# Patient Record
Sex: MALE | Race: BLACK OR AFRICAN AMERICAN | NOT HISPANIC OR LATINO | Employment: OTHER | URBAN - METROPOLITAN AREA
[De-identification: names, ages, dates, MRNs, and addresses within clinical notes are randomized per-mention and may not be internally consistent; named-entity substitution may affect disease eponyms.]

---

## 2017-07-09 ENCOUNTER — HOSPITAL ENCOUNTER (INPATIENT)
Facility: HOSPITAL | Age: 42
LOS: 5 days | Discharge: NURSING, REHAB OR LONG-TERM CARE | DRG: 189 | End: 2017-07-14
Attending: INTERNAL MEDICINE | Admitting: INTERNAL MEDICINE
Payer: MEDICARE

## 2017-07-09 DIAGNOSIS — J96.22 ACUTE ON CHRONIC RESPIRATORY FAILURE WITH HYPERCAPNIA: ICD-10-CM

## 2017-07-09 DIAGNOSIS — E66.2 MORBID OBESITY WITH ALVEOLAR HYPOVENTILATION: ICD-10-CM

## 2017-07-09 DIAGNOSIS — J96.90 RESPIRATORY FAILURE: ICD-10-CM

## 2017-07-09 DIAGNOSIS — I27.20 PULMONARY HYPERTENSION: ICD-10-CM

## 2017-07-09 DIAGNOSIS — J96.12 CHRONIC RESPIRATORY ACIDOSIS: Primary | ICD-10-CM

## 2017-07-09 DIAGNOSIS — E88.09 HYPOALBUMINEMIA: ICD-10-CM

## 2017-07-09 DIAGNOSIS — M79.604 PAIN OF RIGHT LOWER EXTREMITY: ICD-10-CM

## 2017-07-09 PROBLEM — E66.01 MORBID OBESITY: Status: ACTIVE | Noted: 2017-07-09

## 2017-07-09 PROBLEM — E88.9: Status: ACTIVE | Noted: 2017-07-09

## 2017-07-09 PROBLEM — I27.29: Status: ACTIVE | Noted: 2017-07-09

## 2017-07-09 PROBLEM — I50.9 CONGESTIVE HEART FAILURE: Status: ACTIVE | Noted: 2017-07-09

## 2017-07-09 LAB
ABO + RH BLD: NORMAL
ALBUMIN SERPL BCP-MCNC: 3.3 G/DL
ALBUMIN SERPL BCP-MCNC: 3.3 G/DL
ALP SERPL-CCNC: 67 U/L
ALP SERPL-CCNC: 67 U/L
ALT SERPL W/O P-5'-P-CCNC: 15 U/L
ALT SERPL W/O P-5'-P-CCNC: 15 U/L
ANION GAP SERPL CALC-SCNC: 6 MMOL/L
APTT BLDCRRT: 22.4 SEC
AST SERPL-CCNC: 13 U/L
AST SERPL-CCNC: 13 U/L
BACTERIA #/AREA URNS AUTO: ABNORMAL /HPF
BASOPHILS # BLD AUTO: ABNORMAL K/UL
BASOPHILS NFR BLD: 0 %
BILIRUB DIRECT SERPL-MCNC: 0.3 MG/DL
BILIRUB SERPL-MCNC: 0.5 MG/DL
BILIRUB SERPL-MCNC: 0.5 MG/DL
BILIRUB UR QL STRIP: NEGATIVE
BLD GP AB SCN CELLS X3 SERPL QL: NORMAL
BNP SERPL-MCNC: 84 PG/ML
BUN SERPL-MCNC: 14 MG/DL
CA-I BLDV-SCNC: 0.79 MMOL/L
CALCIUM SERPL-MCNC: 9.2 MG/DL
CHLORIDE SERPL-SCNC: 92 MMOL/L
CLARITY UR REFRACT.AUTO: ABNORMAL
CO2 SERPL-SCNC: 43 MMOL/L
COLOR UR AUTO: YELLOW
CREAT SERPL-MCNC: 1.1 MG/DL
DIFFERENTIAL METHOD: ABNORMAL
EOSINOPHIL # BLD AUTO: ABNORMAL K/UL
EOSINOPHIL NFR BLD: 0 %
ERYTHROCYTE [DISTWIDTH] IN BLOOD BY AUTOMATED COUNT: 16.3 %
EST. GFR  (AFRICAN AMERICAN): >60 ML/MIN/1.73 M^2
EST. GFR  (NON AFRICAN AMERICAN): >60 ML/MIN/1.73 M^2
GLUCOSE SERPL-MCNC: 157 MG/DL
GLUCOSE UR QL STRIP: NEGATIVE
HCT VFR BLD AUTO: 43.8 %
HGB BLD-MCNC: 11.9 G/DL
HGB UR QL STRIP: ABNORMAL
HYALINE CASTS UR QL AUTO: 4 /LPF
INR PPP: 1.3
KETONES UR QL STRIP: NEGATIVE
LACTATE SERPL-SCNC: 0.8 MMOL/L
LEUKOCYTE ESTERASE UR QL STRIP: ABNORMAL
LYMPHOCYTES # BLD AUTO: ABNORMAL K/UL
LYMPHOCYTES NFR BLD: 5 %
MAGNESIUM SERPL-MCNC: 2 MG/DL
MCH RBC QN AUTO: 26 PG
MCHC RBC AUTO-ENTMCNC: 27.2 %
MCV RBC AUTO: 96 FL
MICROSCOPIC COMMENT: ABNORMAL
MONOCYTES # BLD AUTO: ABNORMAL K/UL
MONOCYTES NFR BLD: 6 %
MYELOCYTES NFR BLD MANUAL: 2 %
NEUTROPHILS NFR BLD: 87 %
NITRITE UR QL STRIP: NEGATIVE
NON-SQ EPI CELLS #/AREA URNS AUTO: 3 /HPF
OVALOCYTES BLD QL SMEAR: ABNORMAL
PH UR STRIP: 5 [PH] (ref 5–8)
PHOSPHATE SERPL-MCNC: 3.9 MG/DL
PLATELET # BLD AUTO: 166 K/UL
PLATELET BLD QL SMEAR: ABNORMAL
PMV BLD AUTO: 10.9 FL
POIKILOCYTOSIS BLD QL SMEAR: SLIGHT
POLYCHROMASIA BLD QL SMEAR: ABNORMAL
POTASSIUM SERPL-SCNC: 5 MMOL/L
PROT SERPL-MCNC: 9.2 G/DL
PROT SERPL-MCNC: 9.2 G/DL
PROT UR QL STRIP: ABNORMAL
PROTHROMBIN TIME: 13.3 SEC
RBC # BLD AUTO: 4.57 M/UL
RBC #/AREA URNS AUTO: 51 /HPF (ref 0–4)
SODIUM SERPL-SCNC: 141 MMOL/L
SP GR UR STRIP: 1.02 (ref 1–1.03)
SQUAMOUS #/AREA URNS AUTO: 0 /HPF
STOMATOCYTES BLD QL SMEAR: PRESENT
URN SPEC COLLECT METH UR: ABNORMAL
UROBILINOGEN UR STRIP-ACNC: 2 EU/DL
WBC # BLD AUTO: 8.54 K/UL
WBC #/AREA URNS AUTO: 43 /HPF (ref 0–5)
WBC CLUMPS UR QL AUTO: ABNORMAL

## 2017-07-09 PROCEDURE — 82803 BLOOD GASES ANY COMBINATION: CPT

## 2017-07-09 PROCEDURE — 85007 BL SMEAR W/DIFF WBC COUNT: CPT

## 2017-07-09 PROCEDURE — 83605 ASSAY OF LACTIC ACID: CPT

## 2017-07-09 PROCEDURE — 81001 URINALYSIS AUTO W/SCOPE: CPT

## 2017-07-09 PROCEDURE — 85610 PROTHROMBIN TIME: CPT

## 2017-07-09 PROCEDURE — 63600175 PHARM REV CODE 636 W HCPCS: Performed by: STUDENT IN AN ORGANIZED HEALTH CARE EDUCATION/TRAINING PROGRAM

## 2017-07-09 PROCEDURE — 20000000 HC ICU ROOM

## 2017-07-09 PROCEDURE — 93010 ELECTROCARDIOGRAM REPORT: CPT | Mod: ,,, | Performed by: INTERNAL MEDICINE

## 2017-07-09 PROCEDURE — 86900 BLOOD TYPING SEROLOGIC ABO: CPT

## 2017-07-09 PROCEDURE — 85730 THROMBOPLASTIN TIME PARTIAL: CPT

## 2017-07-09 PROCEDURE — 83880 ASSAY OF NATRIURETIC PEPTIDE: CPT

## 2017-07-09 PROCEDURE — 80053 COMPREHEN METABOLIC PANEL: CPT

## 2017-07-09 PROCEDURE — 36600 WITHDRAWAL OF ARTERIAL BLOOD: CPT

## 2017-07-09 PROCEDURE — 99900035 HC TECH TIME PER 15 MIN (STAT)

## 2017-07-09 PROCEDURE — 94761 N-INVAS EAR/PLS OXIMETRY MLT: CPT

## 2017-07-09 PROCEDURE — 83735 ASSAY OF MAGNESIUM: CPT

## 2017-07-09 PROCEDURE — 27000190 HC CPAP FULL FACE MASK W/VALVE

## 2017-07-09 PROCEDURE — 94640 AIRWAY INHALATION TREATMENT: CPT

## 2017-07-09 PROCEDURE — 84100 ASSAY OF PHOSPHORUS: CPT

## 2017-07-09 PROCEDURE — 93005 ELECTROCARDIOGRAM TRACING: CPT

## 2017-07-09 PROCEDURE — 86901 BLOOD TYPING SEROLOGIC RH(D): CPT

## 2017-07-09 PROCEDURE — 25000003 PHARM REV CODE 250: Performed by: STUDENT IN AN ORGANIZED HEALTH CARE EDUCATION/TRAINING PROGRAM

## 2017-07-09 PROCEDURE — 94660 CPAP INITIATION&MGMT: CPT

## 2017-07-09 PROCEDURE — 25000242 PHARM REV CODE 250 ALT 637 W/ HCPCS: Performed by: STUDENT IN AN ORGANIZED HEALTH CARE EDUCATION/TRAINING PROGRAM

## 2017-07-09 PROCEDURE — 85027 COMPLETE CBC AUTOMATED: CPT

## 2017-07-09 PROCEDURE — 82330 ASSAY OF CALCIUM: CPT

## 2017-07-09 RX ORDER — POTASSIUM CHLORIDE 20 MEQ/15ML
60 SOLUTION ORAL
Status: DISCONTINUED | OUTPATIENT
Start: 2017-07-09 | End: 2017-07-10

## 2017-07-09 RX ORDER — ACETAMINOPHEN 325 MG/1
650 TABLET ORAL EVERY 4 HOURS PRN
Status: DISCONTINUED | OUTPATIENT
Start: 2017-07-09 | End: 2017-07-11

## 2017-07-09 RX ORDER — FUROSEMIDE 10 MG/ML
40 INJECTION INTRAMUSCULAR; INTRAVENOUS ONCE
Status: COMPLETED | OUTPATIENT
Start: 2017-07-09 | End: 2017-07-09

## 2017-07-09 RX ORDER — ONDANSETRON 2 MG/ML
8 INJECTION INTRAMUSCULAR; INTRAVENOUS EVERY 12 HOURS PRN
Status: DISCONTINUED | OUTPATIENT
Start: 2017-07-09 | End: 2017-07-14 | Stop reason: HOSPADM

## 2017-07-09 RX ORDER — POTASSIUM CHLORIDE 20 MEQ/15ML
40 SOLUTION ORAL
Status: DISCONTINUED | OUTPATIENT
Start: 2017-07-09 | End: 2017-07-10

## 2017-07-09 RX ORDER — SODIUM CHLORIDE 0.9 % (FLUSH) 0.9 %
3 SYRINGE (ML) INJECTION EVERY 8 HOURS
Status: DISCONTINUED | OUTPATIENT
Start: 2017-07-09 | End: 2017-07-14 | Stop reason: HOSPADM

## 2017-07-09 RX ORDER — MAGNESIUM SULFATE HEPTAHYDRATE 40 MG/ML
4 INJECTION, SOLUTION INTRAVENOUS
Status: DISCONTINUED | OUTPATIENT
Start: 2017-07-09 | End: 2017-07-10

## 2017-07-09 RX ORDER — MAGNESIUM SULFATE HEPTAHYDRATE 40 MG/ML
2 INJECTION, SOLUTION INTRAVENOUS
Status: DISCONTINUED | OUTPATIENT
Start: 2017-07-09 | End: 2017-07-10

## 2017-07-09 RX ORDER — SUCCINYLCHOLINE CHLORIDE 20 MG/ML
INJECTION INTRAMUSCULAR; INTRAVENOUS
Status: DISCONTINUED
Start: 2017-07-09 | End: 2017-07-10 | Stop reason: WASHOUT

## 2017-07-09 RX ORDER — IPRATROPIUM BROMIDE AND ALBUTEROL SULFATE 2.5; .5 MG/3ML; MG/3ML
3 SOLUTION RESPIRATORY (INHALATION) EVERY 4 HOURS PRN
Status: DISCONTINUED | OUTPATIENT
Start: 2017-07-09 | End: 2017-07-14 | Stop reason: HOSPADM

## 2017-07-09 RX ORDER — PROPOFOL 10 MG/ML
INJECTION, EMULSION INTRAVENOUS
Status: DISCONTINUED
Start: 2017-07-09 | End: 2017-07-10 | Stop reason: WASHOUT

## 2017-07-09 RX ORDER — ENOXAPARIN SODIUM 100 MG/ML
40 INJECTION SUBCUTANEOUS
Status: DISCONTINUED | OUTPATIENT
Start: 2017-07-09 | End: 2017-07-14 | Stop reason: HOSPADM

## 2017-07-09 RX ORDER — ETOMIDATE 2 MG/ML
INJECTION INTRAVENOUS
Status: DISCONTINUED
Start: 2017-07-09 | End: 2017-07-10 | Stop reason: WASHOUT

## 2017-07-09 RX ORDER — ROCURONIUM BROMIDE 10 MG/ML
INJECTION, SOLUTION INTRAVENOUS
Status: DISCONTINUED
Start: 2017-07-09 | End: 2017-07-10 | Stop reason: WASHOUT

## 2017-07-09 RX ADMIN — ENOXAPARIN SODIUM 40 MG: 100 INJECTION SUBCUTANEOUS at 05:07

## 2017-07-09 RX ADMIN — IPRATROPIUM BROMIDE AND ALBUTEROL SULFATE 3 ML: .5; 3 SOLUTION RESPIRATORY (INHALATION) at 07:07

## 2017-07-09 RX ADMIN — FUROSEMIDE 40 MG: 10 INJECTION, SOLUTION INTRAVENOUS at 05:07

## 2017-07-09 RX ADMIN — Medication 3 ML: at 11:07

## 2017-07-09 NOTE — ASSESSMENT & PLAN NOTE
Patient on BiPAP 20/10 and 30 percent, satting at 92 percent  Patient was on Lasix at home, denies taking for ~3 months Lasix at OSH - will do 1x lasix 40 mg

## 2017-07-09 NOTE — ASSESSMENT & PLAN NOTE
2D TTE  ECG  - OSH reports pulmonary HTN estimate of 80  - confirm vaccination status - influenza and pneumococcal

## 2017-07-09 NOTE — SUBJECTIVE & OBJECTIVE
Review of patient's allergies indicates:   Allergen Reactions    Morphine        Family History     None        Social History Main Topics    Smoking status: Not on file    Smokeless tobacco: Not on file    Alcohol use Not on file    Drug use: Unknown    Sexual activity: Not on file      Review of Systems   Constitutional: Positive for activity change. Negative for fever.   Respiratory: Positive for shortness of breath. Negative for cough and chest tightness.    Cardiovascular: Positive for leg swelling. Negative for chest pain and palpitations.   Gastrointestinal: Negative for abdominal distention, constipation, diarrhea, nausea and vomiting.   Neurological: Positive for headaches.     Objective:     Vital Signs (Most Recent):  Temp: 97.3 °F (36.3 °C) (07/09/17 1415)  Pulse: 77 (07/09/17 1600)  Resp: (!) 26 (07/09/17 1600)  BP: 119/66 (07/09/17 1600)  SpO2: (!) 92 % (07/09/17 1600) Vital Signs (24h Range):  Temp:  [97.3 °F (36.3 °C)] 97.3 °F (36.3 °C)  Pulse:  [77-92] 77  Resp:  [24-27] 26  SpO2:  [92 %-100 %] 92 %  BP: (114-129)/(66-75) 119/66      There is no height or weight on file to calculate BMI.      Intake/Output Summary (Last 24 hours) at 07/09/17 1622  Last data filed at 07/09/17 1600   Gross per 24 hour   Intake                0 ml   Output              285 ml   Net             -285 ml       Physical Exam   Constitutional: He is oriented to person, place, and time. He appears well-developed and well-nourished. He is cooperative. Face mask in place.   HENT:   Head: Normocephalic and atraumatic.   Eyes: Conjunctivae and EOM are normal. Pupils are equal, round, and reactive to light.   Neck: Normal range of motion.   Cardiovascular: Normal rate, regular rhythm, S1 normal, S2 normal, normal heart sounds and intact distal pulses.  Exam reveals no friction rub.    No murmur heard.  Pulses:       Radial pulses are 2+ on the right side, and 2+ on the left side.        Posterior tibial pulses are 2+ on  the right side, and 2+ on the left side.   Pulmonary/Chest: Effort normal.   Abdominal: Soft.   Pannus, with evidence of yeast buildup and skin breakdown    Musculoskeletal: He exhibits edema.   Neurological: He is alert and oriented to person, place, and time.   Skin: Skin is warm and dry.   Secondary to body habitus patient has panniculi which have areas of fungal growth and some early skin breakdown   Vitals reviewed.      Vents:  Oxygen Concentration (%): 30 (07/09/17 1600)  Lines/Drains/Airways     Drain                 Urethral Catheter 68477 days          Peripheral Intravenous Line                 Peripheral IV - Single Lumen 07/09/17 Right Hand less than 1 day              Significant Labs:    CBC/Anemia Profile:    Recent Labs  Lab 07/09/17  1530   WBC 8.54   HGB 11.9*   HCT 43.8      MCV 96   RDW 16.3*        Chemistries:    Recent Labs  Lab 07/09/17  1530      K 5.0   CL 92*   CO2 43*   BUN 14   CREATININE 1.1   CALCIUM 9.2   ALBUMIN 3.3*  3.3*   PROT 9.2*  9.2*   BILITOT 0.5  0.5   ALKPHOS 67  67   ALT 15  15   AST 13  13   MG 2.0   PHOS 3.9       Recent Lab Results       07/09/17  1530      Albumin 3.3(L)      3.3(L)     Alkaline Phosphatase 67      67     ALT 15      15     Anion Gap 6(L)     AST 13      13     Total Bilirubin 0.5  Comment:  For infants and newborns, interpretation of results should be based  on gestational age, weight and in agreement with clinical  observations.  Premature Infant recommended reference ranges:  Up to 24 hours.............<8.0 mg/dL  Up to 48 hours............<12.0 mg/dL  3-5 days..................<15.0 mg/dL  6-29 days.................<15.0 mg/dL        0.5  Comment:  For infants and newborns, interpretation of results should be based  on gestational age, weight and in agreement with clinical  observations.  Premature Infant recommended reference ranges:  Up to 24 hours.............<8.0 mg/dL  Up to 48 hours............<12.0 mg/dL  3-5  days..................<15.0 mg/dL  6-29 days.................<15.0 mg/dL       BNP 84  Comment:  Values of less than 100 pg/ml are consistent with non-CHF populations.     BUN, Bld 14     Calcium 9.2     Chloride 92(L)     CO2 43  Comment:  *Critical value -   Results called to and read back byLIANE LANDON RN:  (HH)     Creatinine 1.1     eGFR if African American >60.0     eGFR if non  >60.0  Comment:  Calculation used to obtain the estimated glomerular filtration  rate (eGFR) is the CKD-EPI equation. Since race is unknown   in our information system, the eGFR values for   -American and Non--American patients are given   for each creatinine result.       Glucose 157(H)     Hematocrit 43.8     Hemoglobin 11.9(L)     Lactate, Mason 0.8  Comment:  Falsely low lactic acid results can be found in samples   containing >=13.0 mg/dL total bilirubin and/or >=3.5 mg/dL   direct bilirubin.       Magnesium 2.0     MCH 26.0(L)     MCHC 27.2(L)     MCV 96     MPV 10.9     Phosphorus 3.9     Platelets 166     Potassium 5.0     Total Protein 9.2(H)      9.2(H)     RBC 4.57(L)     RDW 16.3(H)     Sodium 141     WBC 8.54           Significant Imaging: I have reviewed all pertinent imaging results/findings within the past 24 hours.

## 2017-07-09 NOTE — HOSPITAL COURSE
7/9/2017 - patient on BiPAP of 20/10, satting well, CMP with Bicarb of 43  7/10/2017 - patient improved on BiPAP overnight back to near baseline CO2 of ~90.

## 2017-07-09 NOTE — PROGRESS NOTES
Patient requests money from wallet be placed in safe. Elizabeth RN and Sangita RN counted [phone] dollars and placed in patient valuable envelope. Envelope number [id number]. Envelope signed by mother and patient educated on how to claim envelope.

## 2017-07-09 NOTE — H&P
[facility]  Critical Care Medicine  History & Physical    Patient Name: Naeem Murray  MRN: [id number]  Admission Date: 7/9/2017  Hospital Length of Stay: 0 days  Code Status: Full Code  Attending Physician: Irma Tatum MD   Primary Care Provider: Provider Not[facility]ystem   Principal Problem: Acute on chronic respiratory failure with hypercapnia    Subjective:     HPI:  42 year old male who presents as a transfer from [facility] where he had been admitted for hypercapneic respiratory failure on 7/6/16. He has a PMHx of CHF, pulmonary hypertension, cellulitis, hypertension and morbid obesity (now ~650lbs down from 780). On the evening of 7/5/17 Mr. Murray has worsening SOB which led him to be transported to [facility] Until 3 months ago, Mr. Murray was mobilizing without assistance and able to climb a few stairs into his home but from March 2017 on he has become permanently bed bound due to leg pain. He was taking Percocet for leg pain, ran out and didn't return to the physician for any of his prescriptions to be refilled. He utilizes CPAP during sleep and requires home oxygen at 4L now, which he is complaint with. Per his mother Berenice he has been dealing with these respiratory issues for 11 years. She also endorses that he has ceased his at home medications since becoming bed bound.     Hospital/ICU Course:  7/9/2017 - patient on BiPAP of 20/10, satting well, CMP with Bicarb of 43           Review of patient's allergies indicates:   Allergen Reactions    Morphine        Family History     None        Social History Main Topics    Smoking status: Not on file    Smokeless tobacco: Not on file    Alcohol use Not on file    Drug use: Unknown    Sexual activity: Not on file      Review of Systems   Constitutional: Positive for activity change. Negative for fever.   Respiratory: Positive for shortness of breath. Negative for cough and chest tightness.    Cardiovascular:  Positive for leg swelling. Negative for chest pain and palpitations.   Gastrointestinal: Negative for abdominal distention, constipation, diarrhea, nausea and vomiting.   Neurological: Positive for headaches.     Objective:     Vital Signs (Most Recent):  Temp: 97.3 °F (36.3 °C) (07/09/17 1415)  Pulse: 77 (07/09/17 1600)  Resp: (!) 26 (07/09/17 1600)  BP: 119/66 (07/09/17 1600)  SpO2: (!) 92 % (07/09/17 1600) Vital Signs (24h Range):  Temp:  [97.3 °F (36.3 °C)] 97.3 °F (36.3 °C)  Pulse:  [77-92] 77  Resp:  [24-27] 26  SpO2:  [92 %-100 %] 92 %  BP: (114-129)/(66-75) 119/66      There is no height or weight on file to calculate BMI.      Intake/Output Summary (Last 24 hours) at 07/09/17 1622  Last data filed at 07/09/17 1600   Gross per 24 hour   Intake                0 ml   Output              285 ml   Net             -285 ml       Physical Exam   Constitutional: He is oriented to person, place, and time. He appears well-developed and well-nourished. He is cooperative. Face mask in place.   HENT:   Head: Normocephalic and atraumatic.   Eyes: Conjunctivae and EOM are normal. Pupils are equal, round, and reactive to light.   Neck: Normal range of motion.   Cardiovascular: Normal rate, regular rhythm, S1 normal, S2 normal, normal heart sounds and intact distal pulses.  Exam reveals no friction rub.    No murmur heard.  Pulses:       Radial pulses are 2+ on the right side, and 2+ on the left side.        Posterior tibial pulses are 2+ on the right side, and 2+ on the left side.   Pulmonary/Chest: Effort normal.   Abdominal: Soft.   Pannus, with evidence of yeast buildup and skin breakdown    Musculoskeletal: He exhibits edema.   Neurological: He is alert and oriented to person, place, and time.   Skin: Skin is warm and dry.   Secondary to body habitus patient has panniculi which have areas of fungal growth and some early skin breakdown   Vitals reviewed.      Vents:  Oxygen Concentration (%): 30 (07/09/17  1600)  Lines/Drains/Airways     Drain                 Urethral Catheter 32459 days          Peripheral Intravenous Line                 Peripheral IV - Single Lumen 07/09/17 Right Hand less than 1 day              Significant Labs:    CBC/Anemia Profile:    Recent Labs  Lab 07/09/17  1530   WBC 8.54   HGB 11.9*   HCT 43.8      MCV 96   RDW 16.3*        Chemistries:    Recent Labs  Lab 07/09/17  1530      K 5.0   CL 92*   CO2 43*   BUN 14   CREATININE 1.1   CALCIUM 9.2   ALBUMIN 3.3*  3.3*   PROT 9.2*  9.2*   BILITOT 0.5  0.5   ALKPHOS 67  67   ALT 15  15   AST 13  13   MG 2.0   PHOS 3.9       Recent Lab Results       07/09/17  1530      Albumin 3.3(L)      3.3(L)     Alkaline Phosphatase 67      67     ALT 15      15     Anion Gap 6(L)     AST 13      13     Total Bilirubin 0.5  Comment:  For infants and newborns, interpretation of results should be based  on gestational age, weight and in agreement with clinical  observations.  Premature Infant recommended reference ranges:  Up to 24 hours.............<8.0 mg/dL  Up to 48 hours............<12.0 mg/dL  3-5 days..................<15.0 mg/dL  6-29 days.................<15.0 mg/dL        0.5  Comment:  For infants and newborns, interpretation of results should be based  on gestational age, weight and in agreement with clinical  observations.  Premature Infant recommended reference ranges:  Up to 24 hours.............<8.0 mg/dL  Up to 48 hours............<12.0 mg/dL  3-5 days..................<15.0 mg/dL  6-29 days.................<15.0 mg/dL       BNP 84  Comment:  Values of less than 100 pg/ml are consistent with non-CHF populations.     BUN, Bld 14     Calcium 9.2     Chloride 92(L)     CO2 43  Comment:  *Critical value -   Results called to and read back byLIANE LANODN RN:  (HH)     Creatinine 1.1     eGFR if African American >60.0     eGFR if non  >60.0  Comment:  Calculation used to obtain the estimated glomerular  filtration  rate (eGFR) is the CKD-EPI equation. Since race is unknown   in our information system, the eGFR values for   -American and Non--American patients are given   for each creatinine result.       Glucose 157(H)     Hematocrit 43.8     Hemoglobin 11.9(L)     Lactate, Mason 0.8  Comment:  Falsely low lactic acid results can be found in samples   containing >=13.0 mg/dL total bilirubin and/or >=3.5 mg/dL   direct bilirubin.       Magnesium 2.0     MCH 26.0(L)     MCHC 27.2(L)     MCV 96     MPV 10.9     Phosphorus 3.9     Platelets 166     Potassium 5.0     Total Protein 9.2(H)      9.2(H)     RBC 4.57(L)     RDW 16.3(H)     Sodium 141     WBC 8.54           Significant Imaging: I have reviewed all pertinent imaging results/findings within the past 24 hours.    Assessment/Plan:     Pulmonary   * Acute on chronic respiratory failure with hypercapnia    Patient on BiPAP 20/10 and 30 percent, satting at 92 percent  Patient was on Lasix at home, denies taking for ~3 months Lasix at OSH - will do 1x lasix 40 mg             Pulmonary hypertension    2D TTE  ECG  - OSH reports pulmonary HTN estimate of 80  - confirm vaccination status - influenza and pneumococcal        Cardiac   Congestive heart failure    See pulmonary hypertension         Fluids/Electrolytes/Nutrition/GI   Morbid obesity    650 lbs per OSH report            Critical Care Medicine Daily Checklist:    A: Awake: RASS Goal/Actual Goal:    Actual: Tadeo Agitation Sedation Scale (RASS): Drowsy   B: Spontaneous Breathing Trial Performed?     C: SAT & SBT Coordinated?  NA                      D: Delirium: CAM-ICU Overall CAM-ICU: Negative   E: Early Mobility Performed? No   F: Feeding Goal:    Status:     Current Diet Order   Procedures    Diet NPO      AS: Analgesia/Sedation NA   T: Thromboembolic Prophylaxis On Heparin   H: HOB > 300 YES   U: Stress Ulcer Prophylaxis (if needed) NA   G: Glucose Control Currently NPO   B: Bowel Function      I: Indwelling Catheter (Lines & Hines) Necessity Hines, peripheral IV   D: De-escalation of Antimicrobials/Pharmacotherapies Pending clinical course    Plan for the day/ETD Pending clinical course    Code Status:  Family/Goals of Care: Full Code       Critical secondary to Patient has a condition that poses threat to life and bodily function: hypercapnic respiratory failure        Critical care was time spent personally by me on the following activities: development of treatment plan with patient or surrogate and bedside caregivers, discussions with consultants, evaluation of patient's response to treatment, examination of patient, ordering and performing treatments and interventions, ordering and review of laboratory studies, ordering and review of radiographic studies, pulse oximetry, re-evaluation of patient's condition. This critical care time did not overlap with that of any other provider or involve time for any procedures.     Beto Rosenberg MD PGY-1  Critical Care Medicine  [facility]

## 2017-07-09 NOTE — HPI
42 year old male who presents as a transfer from [facility] where he had been admitted for hypercapneic respiratory failure on 7/6/16. He has a PMHx of CHF, pulmonary hypertension, cellulitis, hypertension and morbid obesity (now ~650lbs down from 780). On the evening of 7/5/17 Mr. Murray has worsening SOB which led him to be transported to [facility] Until 3 months ago, Mr. Murray was mobilizing without assistance and able to climb a few stairs into his home but from March 2017 on he has become permanently bed bound due to leg pain. He was taking Percocet for leg pain, ran out and didn't return to the physician for any of his prescriptions to be refilled. He utilizes CPAP during sleep and requires home oxygen at 4L now, which he is complaint with. Per his mother Berenice he has been dealing with these respiratory issues for 11 years. She also endorses that he has ceased his at home medications since becoming bed bound.

## 2017-07-09 NOTE — PLAN OF CARE
Problem: Patient Care Overview  Goal: Plan of Care Review  No acute events noted since arrival. Patient currently on Bipap 30% 20/10. Patient resting in bed eyes closed. Orientedx4 since arrival. Right hand PIV patent. Blood and urine sent to lab for evaluation. Mother at bedside. Plan of care discussed with patient. No signs of distress. VSS. Will continue to monitor.

## 2017-07-09 NOTE — PROGRESS NOTES
Patient arrived on unit via EMS. Placed in bed with assistance. Attached to DM monitor and placed on bipap by RT. CCS MD Doe notified of arrival and at bedside. VSS. Sats 96%. Will continue to monitor.

## 2017-07-10 PROBLEM — I50.9 CONGESTIVE HEART FAILURE: Status: ACTIVE | Noted: 2017-07-10

## 2017-07-10 PROBLEM — E88.09 HYPOALBUMINEMIA: Status: ACTIVE | Noted: 2017-07-10

## 2017-07-10 PROBLEM — J96.12 CHRONIC RESPIRATORY ACIDOSIS: Status: ACTIVE | Noted: 2017-07-10

## 2017-07-10 LAB
ALLENS TEST: ABNORMAL
ANION GAP SERPL CALC-SCNC: 5 MMOL/L
ANION GAP SERPL CALC-SCNC: 7 MMOL/L
BASOPHILS # BLD AUTO: 0.01 K/UL
BASOPHILS NFR BLD: 0.2 %
BUN SERPL-MCNC: 15 MG/DL
BUN SERPL-MCNC: 15 MG/DL
CALCIUM SERPL-MCNC: 9.1 MG/DL
CALCIUM SERPL-MCNC: 9.2 MG/DL
CHLORIDE SERPL-SCNC: 89 MMOL/L
CHLORIDE SERPL-SCNC: 91 MMOL/L
CO2 SERPL-SCNC: 45 MMOL/L
CO2 SERPL-SCNC: 46 MMOL/L
CREAT SERPL-MCNC: 1 MG/DL
CREAT SERPL-MCNC: 1.1 MG/DL
DELSYS: ABNORMAL
DIASTOLIC DYSFUNCTION: NO
DIFFERENTIAL METHOD: ABNORMAL
EOSINOPHIL # BLD AUTO: 0.1 K/UL
EOSINOPHIL NFR BLD: 1.4 %
EP: 10
ERYTHROCYTE [DISTWIDTH] IN BLOOD BY AUTOMATED COUNT: 16.6 %
ERYTHROCYTE [SEDIMENTATION RATE] IN BLOOD BY WESTERGREN METHOD: 14 MM/H
ERYTHROCYTE [SEDIMENTATION RATE] IN BLOOD BY WESTERGREN METHOD: 20 MM/H
ERYTHROCYTE [SEDIMENTATION RATE] IN BLOOD BY WESTERGREN METHOD: 20 MM/H
EST. GFR  (AFRICAN AMERICAN): >60 ML/MIN/1.73 M^2
EST. GFR  (AFRICAN AMERICAN): >60 ML/MIN/1.73 M^2
EST. GFR  (NON AFRICAN AMERICAN): >60 ML/MIN/1.73 M^2
EST. GFR  (NON AFRICAN AMERICAN): >60 ML/MIN/1.73 M^2
ESTIMATED PA SYSTOLIC PRESSURE: 68.89
FIO2: 30
FIO2: 30
FIO2: 35
GLUCOSE SERPL-MCNC: 104 MG/DL
GLUCOSE SERPL-MCNC: 108 MG/DL
HCO3 UR-SCNC: 51.8 MMOL/L (ref 24–28)
HCO3 UR-SCNC: 55.8 MMOL/L (ref 24–28)
HCO3 UR-SCNC: 56 MMOL/L (ref 24–28)
HCT VFR BLD AUTO: 41.8 %
HGB BLD-MCNC: 11.2 G/DL
IP: 20
IP: 20
IP: 24
LYMPHOCYTES # BLD AUTO: 0.7 K/UL
LYMPHOCYTES NFR BLD: 10.8 %
MAGNESIUM SERPL-MCNC: 2.1 MG/DL
MCH RBC QN AUTO: 25.5 PG
MCHC RBC AUTO-ENTMCNC: 26.8 %
MCV RBC AUTO: 95 FL
MIN VOL: 10.7
MIN VOL: 10.8
MIN VOL: 12.5
MODE: ABNORMAL
MONOCYTES # BLD AUTO: 0.5 K/UL
MONOCYTES NFR BLD: 8.3 %
NEUTROPHILS # BLD AUTO: 5.1 K/UL
NEUTROPHILS NFR BLD: 78.7 %
PCO2 BLDA: 109.6 MMHG (ref 35–45)
PCO2 BLDA: 120.4 MMHG (ref 35–45)
PCO2 BLDA: 98.1 MMHG (ref 35–45)
PH SMN: 7.24 [PH] (ref 7.35–7.45)
PH SMN: 7.32 [PH] (ref 7.35–7.45)
PH SMN: 7.36 [PH] (ref 7.35–7.45)
PHOSPHATE SERPL-MCNC: 2.1 MG/DL
PLATELET # BLD AUTO: 160 K/UL
PMV BLD AUTO: 11.3 FL
PO2 BLDA: 58 MMHG (ref 80–100)
PO2 BLDA: 63 MMHG (ref 80–100)
PO2 BLDA: 82 MMHG (ref 80–100)
POC BE: 24 MMOL/L
POC BE: 30 MMOL/L
POC BE: >30 MMOL/L
POC SATURATED O2: 83 % (ref 95–100)
POC SATURATED O2: 84 % (ref 95–100)
POC SATURATED O2: 94 % (ref 95–100)
POC TCO2: >50 MMOL/L (ref 23–27)
POTASSIUM SERPL-SCNC: 4.2 MMOL/L
POTASSIUM SERPL-SCNC: 4.7 MMOL/L
PROVIDER CREDENTIALS: ABNORMAL
PROVIDER NOTIFIED: ABNORMAL
RBC # BLD AUTO: 4.4 M/UL
RETIRED EF AND QEF - SEE NOTES: 60 (ref 55–65)
SAMPLE: ABNORMAL
SITE: ABNORMAL
SODIUM SERPL-SCNC: 141 MMOL/L
SODIUM SERPL-SCNC: 142 MMOL/L
SP02: 91
SP02: 93
SP02: 95
SPONT RATE: 12
SPONT RATE: 24
SPONT RATE: 27
TIME NOTIFIED: 1525
TRICUSPID VALVE REGURGITATION: ABNORMAL
VERBAL RESULT READBACK PERFORMED: YES
WBC # BLD AUTO: 6.47 K/UL

## 2017-07-10 PROCEDURE — 36600 WITHDRAWAL OF ARTERIAL BLOOD: CPT

## 2017-07-10 PROCEDURE — C1751 CATH, INF, PER/CENT/MIDLINE: HCPCS

## 2017-07-10 PROCEDURE — 85025 COMPLETE CBC W/AUTO DIFF WBC: CPT

## 2017-07-10 PROCEDURE — 99223 1ST HOSP IP/OBS HIGH 75: CPT | Mod: ,,, | Performed by: INTERNAL MEDICINE

## 2017-07-10 PROCEDURE — 80048 BASIC METABOLIC PNL TOTAL CA: CPT | Mod: 91

## 2017-07-10 PROCEDURE — 99900035 HC TECH TIME PER 15 MIN (STAT)

## 2017-07-10 PROCEDURE — 63600175 PHARM REV CODE 636 W HCPCS: Performed by: STUDENT IN AN ORGANIZED HEALTH CARE EDUCATION/TRAINING PROGRAM

## 2017-07-10 PROCEDURE — 63600175 PHARM REV CODE 636 W HCPCS: Performed by: INTERNAL MEDICINE

## 2017-07-10 PROCEDURE — 94760 N-INVAS EAR/PLS OXIMETRY 1: CPT

## 2017-07-10 PROCEDURE — C8929 TTE W OR WO FOL WCON,DOPPLER: HCPCS

## 2017-07-10 PROCEDURE — 25000003 PHARM REV CODE 250: Performed by: STUDENT IN AN ORGANIZED HEALTH CARE EDUCATION/TRAINING PROGRAM

## 2017-07-10 PROCEDURE — 93306 TTE W/DOPPLER COMPLETE: CPT | Mod: 26,,, | Performed by: INTERNAL MEDICINE

## 2017-07-10 PROCEDURE — 76937 US GUIDE VASCULAR ACCESS: CPT

## 2017-07-10 PROCEDURE — 20600001 HC STEP DOWN PRIVATE ROOM

## 2017-07-10 PROCEDURE — 83735 ASSAY OF MAGNESIUM: CPT

## 2017-07-10 PROCEDURE — 94660 CPAP INITIATION&MGMT: CPT

## 2017-07-10 PROCEDURE — 82803 BLOOD GASES ANY COMBINATION: CPT

## 2017-07-10 PROCEDURE — 27000221 HC OXYGEN, UP TO 24 HOURS

## 2017-07-10 PROCEDURE — 36569 INSJ PICC 5 YR+ W/O IMAGING: CPT

## 2017-07-10 PROCEDURE — 84100 ASSAY OF PHOSPHORUS: CPT

## 2017-07-10 PROCEDURE — 94761 N-INVAS EAR/PLS OXIMETRY MLT: CPT

## 2017-07-10 PROCEDURE — 80048 BASIC METABOLIC PNL TOTAL CA: CPT

## 2017-07-10 RX ORDER — SODIUM,POTASSIUM PHOSPHATES 280-250MG
2 POWDER IN PACKET (EA) ORAL
Status: DISCONTINUED | OUTPATIENT
Start: 2017-07-10 | End: 2017-07-10

## 2017-07-10 RX ORDER — FUROSEMIDE 10 MG/ML
40 INJECTION INTRAMUSCULAR; INTRAVENOUS 3 TIMES DAILY
Status: DISCONTINUED | OUTPATIENT
Start: 2017-07-10 | End: 2017-07-12

## 2017-07-10 RX ADMIN — ENOXAPARIN SODIUM 40 MG: 100 INJECTION SUBCUTANEOUS at 04:07

## 2017-07-10 RX ADMIN — FUROSEMIDE 40 MG: 10 INJECTION, SOLUTION INTRAVENOUS at 09:07

## 2017-07-10 RX ADMIN — Medication 3 ML: at 09:07

## 2017-07-10 RX ADMIN — Medication 3 ML: at 05:07

## 2017-07-10 RX ADMIN — FUROSEMIDE 40 MG: 10 INJECTION, SOLUTION INTRAVENOUS at 08:07

## 2017-07-10 RX ADMIN — FUROSEMIDE 40 MG: 10 INJECTION, SOLUTION INTRAVENOUS at 01:07

## 2017-07-10 RX ADMIN — Medication 3 ML: at 01:07

## 2017-07-10 NOTE — PROGRESS NOTES
"Attempt to get accurate weight on pt.  Bed scale read "weight overload."  Unable to get accurate reading; Charge RN aware.  Will continue to monitor.  "

## 2017-07-10 NOTE — RESIDENT HANDOFF
Handoff     Primary Team: [facility] CRITICAL CARE MEDICINE Room Number: 1012/1012 A     Patient Name: Naeem Murray MRN: [id number]     Date of Birth: [id number] Allergies: Morphine     Age: 42 y.o. Admit Date: 7/9/2017     Sex: male  BMI: Body mass index is 85.76 kg/m².     Code Status: Full Code        Illness Level (current clinical status): Watcher - NO    Reason for Admission: Acute on chronic respiratory failure with hypercapnia    Brief HPI (pertinent PMH and diagnosis or differential diagnosis):  42 year old male who presents as a transfer from [facility] where he had been admitted for hypercapneic respiratory failure on 7/6/16. He has a PMHx of CHF, pulmonary hypertension, cellulitis, hypertension and morbid obesity (now ~650lbs down from 780). On the evening of 7/5/17 Mr. Murray has worsening SOB which led him to be transported to [facility]. Until 3 months ago, Mr. Murray was mobilizing without assistance and able to climb a few stairs into his home but from March 2017 on he has become permanently bed bound due to leg pain. He was taking Percocet for leg pain, ran out and didn't return to the physician for any of his prescriptions to be refilled. He utilizes CPAP during sleep and requires home oxygen at 4L now, which he is complaint with. Per his mother Berenice he has been dealing with these respiratory issues for 11 years. She also endorses that he has ceased his at home medications since becoming bed bound.     Hospital Course (updated, brief assessment by system or problem, significant events):   Mr. Murray was admitted to the MICU from [place] for airway watch given his morbid obesity and dependence on BiPAP and concern for potential intubation. He was started on BiPAP at 24/10 and 100% FiO2 and was very quickly able to be weaned down to FiO2 of 30%. ABG's revealed an acute on chronic hypercapnic process which appeared to almost resolve after a night and full day of wearing the BiPAP. He  was able to be taken off during the day and tolerated well. He was additionally started on IV diuresis with great output.     Tasks (specific, using if-then statements):   - Would continue BiPAP QHS and make sure patient has a BiPAP at home (NOT CPAP)  - Would continue diuresis as tolerated by kidneys and likely needs a home regimen upon discharge  - Could consider in house HTS consult for PH or have patient follow up with the pulmonary hypertension clinic as an outpatient   - Weight loss plan in future (outpatient follow up with bariatric medicine)    Discharge Disposition: Home or Self Care    Mentored By: Dr. Shashi Pavon

## 2017-07-10 NOTE — PROGRESS NOTES
Consent obtained. optison given ivp via right hand sl for  imaging. Denies transfusion rxn. Tolerated well. Sl flushed before and after with ns.

## 2017-07-10 NOTE — PLAN OF CARE
Problem: Patient Care Overview  Goal: Plan of Care Review  Outcome: Ongoing (interventions implemented as appropriate)  No acute events throughout day. He is off BiPap, and is on nasal cannula at 2LPM. VS and assessment per flow sheet, patient progressing towards goals as tolerated, plan of care reviewed with Naeem Murray and family, all concerns addressed, will continue to monitor.

## 2017-07-10 NOTE — PLAN OF CARE
Payor: MEDICAID / Plan: MEDICAID OF [place] / Product Type: [facility] /        07/10/17 1358   Discharge Assessment   Assessment Type Discharge Planning Assessment   Confirmed/corrected address and phone number on facesheet? Yes   Assessment information obtained from? Patient   Expected Length of Stay (days) 4   Communicated expected length of stay with patient/caregiver no   Prior to hospitilization cognitive status: Alert/Oriented   Prior to hospitalization functional status: (bed bound)   Current cognitive status: Alert/Oriented   Current Functional Status: Completely Dependent   Arrived From acute care hospital  ([facility])   Lives With alone   Able to Return to Prior Arrangements unable to determine at this time (comments)   Is patient able to care for self after discharge? No   Who are your caregiver(s) and their phone number(s)? Berenice (mother)   Readmission Within The Last 30 Days no previous admission in last 30 days   Patient currently being followed by outpatient case management? No   Patient currently receives home health services? No   Does the patient currently use HME? No   Patient currently receives private duty nursing? N/A   Patient currently receives any other outside agency services? No   Equipment Currently Used at Home CPAP;oxygen   Do you have any problems affording any of your prescribed medications? TBD   Is the patient taking medications as prescribed? no   If no, which medications is patient not taking? patient stopped taking all medications months ago   Do you have any financial concerns preventing you from receiving the healthcare you need? No   Does the patient have transportation to healthcare appointments? Yes   Transportation Available family or friend will provide   On Dialysis? No   Does the patient receive services at the Coumadin Clinic? No   Are there any open cases? No   Discharge Plan A Skilled Nursing Facility   Discharge Plan B Long-term acute care facility (LTAC)    Patient/Family In Agreement With Plan yes   Janelle Hdz, RN, BSN  Case Management  [facility]  Ext. [phone]

## 2017-07-10 NOTE — CONSULTS
Midline placed in right cephalic vein, 18gx10 cmcatheter length. Lot #[id number]. Used real time ultrasound. Image recorded and saved.

## 2017-07-10 NOTE — ASSESSMENT & PLAN NOTE
- 2D TTE pending   - ECG  - OSH reports pulmonary HTN estimate of 90   - confirm vaccination status - influenza and pneumococcal  - likely needs follow up with PH clinic

## 2017-07-10 NOTE — PROGRESS NOTES
Spoke with Magno with Hospital Medicine, the patient is now on their transfer list.  Hospital medicine will take over at 7 am tomorrow morning. Patient is placed on IM T.     Shelly Doe MD, PGY3  Pager [phone]  Staff recommendations to follow.

## 2017-07-10 NOTE — PROGRESS NOTES
[facility]  Critical Care Medicine  Progress Note    Patient Name: Naeem Murray  MRN: [id number]  Admission Date: 7/9/2017  Hospital Length of Stay: 1 days  Code Status: Full Code  Attending Provider: Shashi Pavon*  Primary Care Provider: Provider Notinsystem   Principal Problem: Acute on chronic respiratory failure with hypercapnia    Subjective:     HPI:  42 year old male who presents as a transfer from [facility] where he had been admitted for hypercapneic respiratory failure on 7/6/16. He has a PMHx of CHF, pulmonary hypertension, cellulitis, hypertension and morbid obesity (now ~650lbs down from 780). On the evening of 7/5/17 Mr. Murray has worsening SOB which led him to be transported to [facility]. Until 3 months ago, Mr. Murray was mobilizing without assistance and able to climb a few stairs into his home but from March 2017 on he has become permanently bed bound due to leg pain. He was taking Percocet for leg pain, ran out and didn't return to the physician for any of his prescriptions to be refilled. He utilizes CPAP during sleep and requires home oxygen at 4L now, which he is complaint with. Per his mother Berenice he has been dealing with these respiratory issues for 11 years. She also endorses that he has ceased his at home medications since becoming bed bound.     Hospital/ICU Course:  7/9/2017 - patient on BiPAP of 20/10, satting well, CMP with Bicarb of 43  7/10/2017 - patient improved on BiPAP overnight back to near baseline CO2 of ~90.     Interval History/Significant Events:   Remained on bipap overnight with improvement in acidosis. More wakeful today.     Review of Systems   Constitutional: Negative for chills and fever.   Respiratory: Positive for shortness of breath. Negative for cough.    Cardiovascular: Positive for leg swelling.   Gastrointestinal: Negative for abdominal pain.   Genitourinary: Negative for difficulty urinating.   Skin:  Negative for rash.   Psychiatric/Behavioral: Negative for confusion.     Objective:     Vital Signs (Most Recent):  Temp: 96.1 °F (35.6 °C) (07/10/17 1100)  Pulse: 86 (07/10/17 1200)  Resp: (!) 25 (07/10/17 1200)  BP: (!) 147/63 (07/10/17 1200)  SpO2: (!) 94 % (07/10/17 1200) Vital Signs (24h Range):  Temp:  [96.1 °F (35.6 °C)-98 °F (36.7 °C)] 96.1 °F (35.6 °C)  Pulse:  [75-92] 86  Resp:  [4-29] 25  SpO2:  [89 %-100 %] 94 %  BP: (108-162)/(57-89) 147/63   Weight: (!) 294.8 kg (650 lb)  Body mass index is 85.76 kg/m².      Intake/Output Summary (Last 24 hours) at 07/10/17 1241  Last data filed at 07/10/17 1200   Gross per 24 hour   Intake              250 ml   Output             2090 ml   Net            -1840 ml       Physical Exam   Constitutional: He is oriented to person, place, and time. He appears well-developed and well-nourished. He is cooperative. Face mask in place.   HENT:   Head: Normocephalic and atraumatic.   Eyes: Conjunctivae and EOM are normal. Pupils are equal, round, and reactive to light.   Neck: Normal range of motion.   Cardiovascular: Normal rate, regular rhythm, S1 normal, S2 normal, normal heart sounds and intact distal pulses.  Exam reveals no friction rub.    No murmur heard.  Pulses:       Radial pulses are 2+ on the right side, and 2+ on the left side.        Posterior tibial pulses are 2+ on the right side, and 2+ on the left side.   Pulmonary/Chest: Effort normal.   Abdominal: Soft.   Pannus, with evidence of yeast buildup and skin breakdown    Musculoskeletal: He exhibits edema.   Neurological: He is alert and oriented to person, place, and time.   Skin: Skin is warm and dry.   Secondary to body habitus patient has panniculi which have areas of fungal growth and some early skin breakdown   Vitals reviewed.      Vents:  Oxygen Concentration (%): 30 (07/10/17 1125)  Lines/Drains/Airways     Drain                 Urethral Catheter 96115 days          Peripheral Intravenous Line                  Peripheral IV - Single Lumen 07/09/17 Right Hand 1 day         Midline Catheter Insertion/Assessment  - Single Lumen 07/10/17 1102 Right cephalic vein (lateral side of arm) 18g x 10cm less than 1 day              Significant Labs:    CBC/Anemia Profile:    Recent Labs  Lab 07/09/17  1530 07/10/17  0537   WBC 8.54 6.47   HGB 11.9* 11.2*   HCT 43.8 41.8    160   MCV 96 95   RDW 16.3* 16.6*        Chemistries:    Recent Labs  Lab 07/09/17  1530 07/10/17  0429    141   K 5.0 4.7   CL 92* 91*   CO2 43* 45*   BUN 14 15   CREATININE 1.1 1.0   CALCIUM 9.2 9.2   ALBUMIN 3.3*  3.3*  --    PROT 9.2*  9.2*  --    BILITOT 0.5  0.5  --    ALKPHOS 67  67  --    ALT 15  15  --    AST 13  13  --    MG 2.0 2.1   PHOS 3.9 2.1*       Recent Lab Results       07/10/17  0900 07/10/17  0558 07/10/17  0537 07/10/17  0429 07/09/17  2326      Time Notifed:          Provider Notified:          Verbal Result Readback Performed          Albumin          Alkaline Phosphatase          Allens Test  Pass   Pass     ALT          Anion Gap    5(L)      Appearance, UA          aPTT          AST          Bacteria, UA          Baso #   0.01       Basophil%   0.2       Bilirubin (UA)          Bilirubin, Direct          Total Bilirubin          BNP          Site  RR   LR     BUN, Bld    15      Calcium    9.2      Calcium, Ion          Chloride    91(L)      CO2    45  Comment:  *Critical value -   Results called to and read back by:MARNIE HERMAN RN  ()      Color, UA          Creatinine    1.0      Diastolic Dysfunction No         DelSys  CPAP/BiPAP   CPAP/BiPAP     Differential Method   Automated       EF 60         eGFR if     >60.0      eGFR if non     >60.0  Comment:  Calculation used to obtain the estimated glomerular filtration  rate (eGFR) is the CKD-EPI equation. Since race is unknown   in our information system, the eGFR values for   -American and Non--American patients  are given   for each creatinine result.        Eos #   0.1       Eosinophil%   1.4       EP  10   10     FiO2  35   30     Glucose    104      Glucose, UA          Gran #   5.1       Gran%   78.7(H)       Group & Rh          Hematocrit   41.8       Hemoglobin   11.2(L)       Hyaline Casts, UA          INDIRECT BRITTANIE          Coumadin Monitoring INR          IP  24   20     Ketones, UA          Lactate, Mason          Leukocytes, UA          Lymph #   0.7(L)       Lymph%   10.8(L)       Magnesium    2.1      MCH   25.5(L)       MCHC   26.8(L)       MCV   95       Microscopic Comment          Min Vol  10.8   10.7     Mode  BiPAP   BiPAP     Mono #   0.5       Mono%   8.3       MPV   11.3       Myelocytes          Nitrite, UA          Non-Squam Epith          Occult Blood UA          Ovalocytes          Est. PA Systolic Pressure 68.89(A)         pH, UA          Phosphorus    2.1(L)      Platelet Estimate          Platelets   160       POC BE  >30(H)   30     POC HCO3  56.0(H)   55.8(H)     POC PCO2  98.1(HH)   109.6(HH)     POC PH  7.364   7.315(L)     POC PO2  82   58(LL)     POC SATURATED O2  94(L)   83(L)     POC TCO2  >50(H)   >50(H)     Poik          Poly          Potassium    4.7  Comment:  *Slightly Hemolyzed      Total Protein          Protein, UA          Protime          Provider Credentials:          Rate  20   20     RBC   4.40(L)       RBC, UA          RDW   16.6(H)       Sample  ARTERIAL   ARTERIAL     Sodium    141      Sp02  95   91     Specific Gravity, UA          Specimen UA          Spont Rate  24   27     Squam Epithel, UA          Stomatocytes          Tricuspid Valve Regurgitation MODERATE TO SEVERE(A)         Urobilinogen, UA          WBC Clumps, UA          WBC, UA          WBC   6.47                   07/09/17  1640 07/09/17  1558 07/09/17  1554 07/09/17  1530 07/09/17  1523      Time Notifed:     1525     Provider Notified:     GIULIA     Verbal Result Readback Performed     Yes     Albumin     3.3(L)          3.3(L)      Alkaline Phosphatase    67          67      Allens Test     N/A     ALT    15          15      Anion Gap    6(L)      Appearance, UA  Cloudy(A)        aPTT   22.4  Comment:  aPTT therapeutic range = 39-69 seconds       AST    13          13      Bacteria, UA  Rare        Baso #    CANCELED  Comment:  Result canceled by the ancillary      Basophil%    0.0      Bilirubin (UA)  Negative        Bilirubin, Direct    0.3      Total Bilirubin    0.5  Comment:  For infants and newborns, interpretation of results should be based  on gestational age, weight and in agreement with clinical  observations.  Premature Infant recommended reference ranges:  Up to 24 hours.............<8.0 mg/dL  Up to 48 hours............<12.0 mg/dL  3-5 days..................<15.0 mg/dL  6-29 days.................<15.0 mg/dL            0.5  Comment:  For infants and newborns, interpretation of results should be based  on gestational age, weight and in agreement with clinical  observations.  Premature Infant recommended reference ranges:  Up to 24 hours.............<8.0 mg/dL  Up to 48 hours............<12.0 mg/dL  3-5 days..................<15.0 mg/dL  6-29 days.................<15.0 mg/dL        BNP    84  Comment:  Values of less than 100 pg/ml are consistent with non-CHF populations.      Site     RR     BUN, Bld    14      Calcium    9.2      Calcium, Ion 0.79(L)         Chloride    92(L)      CO2    43  Comment:  *Critical value -   Results called to and read back byLIANE LANDON RN:  (HH)      Color, UA  Yellow        Creatinine    1.1      Diastolic Dysfunction          DelSys     CPAP/BiPAP     Differential Method    Manual      EF          eGFR if     >60.0      eGFR if non     >60.0  Comment:  Calculation used to obtain the estimated glomerular filtration  rate (eGFR) is the CKD-EPI equation. Since race is unknown   in our information system, the eGFR values for    -American and Non--American patients are given   for each creatinine result.        Eos #    CANCELED  Comment:  Result canceled by the ancillary      Eosinophil%    0.0      EP     10     FiO2     30     Glucose    157(H)      Glucose, UA  Negative        Gran #          Gran%    87.0(H)      Group & Rh    O POS      Hematocrit    43.8      Hemoglobin    11.9(L)      Hyaline Casts, UA  4(A)        INDIRECT BRITTANIE    NEG      Coumadin Monitoring INR   1.3  Comment:  Coumadin Therapy:  2.0 - 3.0 for INR for all indicators except mechanical heart valves  and antiphospholipid syndromes which should use 2.5 - 3.5.  (H)       IP     20     Ketones, UA  Negative        Lactate, Mason    0.8  Comment:  Falsely low lactic acid results can be found in samples   containing >=13.0 mg/dL total bilirubin and/or >=3.5 mg/dL   direct bilirubin.        Leukocytes, UA  2+(A)        Lymph #    CANCELED  Comment:  Result canceled by the ancillary      Lymph%    5.0(L)      Magnesium    2.0      MCH    26.0(L)      MCHC    27.2(L)      MCV    96      Microscopic Comment  SEE COMMENT  Comment:  Other formed elements not mentioned in the report are not   present in the microscopic examination.           Min Vol     12.5     Mode     BiPAP     Mono #    CANCELED  Comment:  Result canceled by the ancillary      Mono%    6.0      MPV    10.9      Myelocytes    2.0      Nitrite, UA  Negative        Non-Squam Epith  3(A)        Occult Blood UA  3+(A)        Ovalocytes    Occasional      Est. PA Systolic Pressure          pH, UA  5.0        Phosphorus    3.9      Platelet Estimate    Appears normal      Platelets    166      POC BE     24     POC HCO3     51.8(H)     POC PCO2     120.4(HH)     POC PH     7.242(LL)     POC PO2     63(L)     POC SATURATED O2     84(L)     POC TCO2     >50(H)     Poik    Slight      Poly    Occasional      Potassium    5.0      Total Protein    9.2(H)          9.2(H)      Protein, UA   2+  Comment:  Recommend a 24 hour urine protein or a urine   protein/creatinine ratio if globulin induced proteinuria is  clinically suspected.  (A)        Protime   13.3(H)       Provider Credentials:     MD     Rate     14     RBC    4.57(L)      RBC, UA  51(H)        RDW    16.3(H)      Sample     ARTERIAL     Sodium    141      Sp02     93     Specific Gravity, UA  1.020        Specimen UA  Urine, Catheterized        Spont Rate     12     Squam Epithel, UA  0        Stomatocytes    Present      Tricuspid Valve Regurgitation          Urobilinogen, UA  2.0        WBC Clumps, UA  Occasional(A)        WBC, UA  43(H)        WBC    8.54                      Significant Imaging:  I have reviewed all pertinent imaging results/findings within the past 24 hours.    Assessment/Plan:     Pulmonary   * Acute on chronic respiratory failure with hypercapnia    - Patient on BiPAP 24/10 and 30 percent, satting at 92 percent  - Patient was on Lasix at home, denies taking for ~3 months Lasix at OSH - will do 1x lasix 40 mg   - given lasix 40 overnight with 1 L output  - will continue diuresis given cxr though difficult to interpret given body habitus  - Will start bipap Q4 hours on and Q4 hours off   - likely restrictive disease given obesity hypoventilation            Pulmonary hypertension    - 2D TTE pending   - ECG  - OSH reports pulmonary HTN estimate of 90   - confirm vaccination status - influenza and pneumococcal  - likely needs follow up with PH clinic        Cardiac   Congestive heart failure    See pulmonary hypertension         Fluids/Electrolytes/Nutrition/GI   Morbid obesity    650 lbs per OSH report           Critical Care Medicine Daily Checklist:    A: Awake: RASS Goal/Actual Goal:    Actual: Tadeo Agitation Sedation Scale (RASS): Alert and calm   B: Spontaneous Breathing Trial Performed?     C: SAT & SBT Coordinated?                     D: Delirium: CAM-ICU Overall CAM-ICU: Negative   E: Early Mobility  Performed? Yes   F: Feeding Goal:    Status:     Current Diet Order   Procedures    Diet Diabetic 2200 Calories      AS: Analgesia/Sedation n/a   T: Thromboembolic Prophylaxis lovenox   H: HOB > 300 Yes   U: Stress Ulcer Prophylaxis (if needed) n/a   G: Glucose Control n/a   B: Bowel Function     I: Indwelling Catheter (Lines & Hines) Necessity n/a   D: De-escalation of Antimicrobials/Pharmacotherapies n/a    Plan for the day/ETD stepdown    Code Status:  Family/Goals of Care: Full Code         Critical secondary to Patient has a condition that poses threat to life and bodily function: Acute Hypercapnic Respiratory Failure      Critical care was time spent personally by me on the following activities: development of treatment plan with patient or surrogate and bedside caregivers, discussions with consultants, evaluation of patient's response to treatment, examination of patient, ordering and performing treatments and interventions, ordering and review of laboratory studies, ordering and review of radiographic studies, pulse oximetry, re-evaluation of patient's condition. This critical care time did not overlap with that of any other provider or involve time for any procedures.     Shelly Doe MD  Critical Care Medicine  [facility]

## 2017-07-10 NOTE — SUBJECTIVE & OBJECTIVE
Interval History/Significant Events:   Remained on bipap overnight with improvement in acidosis. More wakeful today.     Review of Systems   Constitutional: Negative for chills and fever.   Respiratory: Positive for shortness of breath. Negative for cough.    Cardiovascular: Positive for leg swelling.   Gastrointestinal: Negative for abdominal pain.   Genitourinary: Negative for difficulty urinating.   Skin: Negative for rash.   Psychiatric/Behavioral: Negative for confusion.     Objective:     Vital Signs (Most Recent):  Temp: 96.1 °F (35.6 °C) (07/10/17 1100)  Pulse: 86 (07/10/17 1200)  Resp: (!) 25 (07/10/17 1200)  BP: (!) 147/63 (07/10/17 1200)  SpO2: (!) 94 % (07/10/17 1200) Vital Signs (24h Range):  Temp:  [96.1 °F (35.6 °C)-98 °F (36.7 °C)] 96.1 °F (35.6 °C)  Pulse:  [75-92] 86  Resp:  [4-29] 25  SpO2:  [89 %-100 %] 94 %  BP: (108-162)/(57-89) 147/63   Weight: (!) 294.8 kg (650 lb)  Body mass index is 85.76 kg/m².      Intake/Output Summary (Last 24 hours) at 07/10/17 1241  Last data filed at 07/10/17 1200   Gross per 24 hour   Intake              250 ml   Output             2090 ml   Net            -1840 ml       Physical Exam   Constitutional: He is oriented to person, place, and time. He appears well-developed and well-nourished. He is cooperative. Face mask in place.   HENT:   Head: Normocephalic and atraumatic.   Eyes: Conjunctivae and EOM are normal. Pupils are equal, round, and reactive to light.   Neck: Normal range of motion.   Cardiovascular: Normal rate, regular rhythm, S1 normal, S2 normal, normal heart sounds and intact distal pulses.  Exam reveals no friction rub.    No murmur heard.  Pulses:       Radial pulses are 2+ on the right side, and 2+ on the left side.        Posterior tibial pulses are 2+ on the right side, and 2+ on the left side.   Pulmonary/Chest: Effort normal.   Abdominal: Soft.   Pannus, with evidence of yeast buildup and skin breakdown    Musculoskeletal: He exhibits edema.    Neurological: He is alert and oriented to person, place, and time.   Skin: Skin is warm and dry.   Secondary to body habitus patient has panniculi which have areas of fungal growth and some early skin breakdown   Vitals reviewed.      Vents:  Oxygen Concentration (%): 30 (07/10/17 1125)  Lines/Drains/Airways     Drain                 Urethral Catheter 07033 days          Peripheral Intravenous Line                 Peripheral IV - Single Lumen 07/09/17 Right Hand 1 day         Midline Catheter Insertion/Assessment  - Single Lumen 07/10/17 1102 Right cephalic vein (lateral side of arm) 18g x 10cm less than 1 day              Significant Labs:    CBC/Anemia Profile:    Recent Labs  Lab 07/09/17  1530 07/10/17  0537   WBC 8.54 6.47   HGB 11.9* 11.2*   HCT 43.8 41.8    160   MCV 96 95   RDW 16.3* 16.6*        Chemistries:    Recent Labs  Lab 07/09/17  1530 07/10/17  0429    141   K 5.0 4.7   CL 92* 91*   CO2 43* 45*   BUN 14 15   CREATININE 1.1 1.0   CALCIUM 9.2 9.2   ALBUMIN 3.3*  3.3*  --    PROT 9.2*  9.2*  --    BILITOT 0.5  0.5  --    ALKPHOS 67  67  --    ALT 15  15  --    AST 13  13  --    MG 2.0 2.1   PHOS 3.9 2.1*       Recent Lab Results       07/10/17  0900 07/10/17  0558 07/10/17  0537 07/10/17  0429 07/09/17  2326      Time Notifed:          Provider Notified:          Verbal Result Readback Performed          Albumin          Alkaline Phosphatase          Allens Test  Pass   Pass     ALT          Anion Gap    5(L)      Appearance, UA          aPTT          AST          Bacteria, UA          Baso #   0.01       Basophil%   0.2       Bilirubin (UA)          Bilirubin, Direct          Total Bilirubin          BNP          Site  RR   LR     BUN, Bld    15      Calcium    9.2      Calcium, Ion          Chloride    91(L)      CO2    45  Comment:  *Critical value -   Results called to and read back by:MARNIE HERMAN RN  ()      Color, UA          Creatinine    1.0      Diastolic  Dysfunction No         DelSys  CPAP/BiPAP   CPAP/BiPAP     Differential Method   Automated       EF 60         eGFR if     >60.0      eGFR if non     >60.0  Comment:  Calculation used to obtain the estimated glomerular filtration  rate (eGFR) is the CKD-EPI equation. Since race is unknown   in our information system, the eGFR values for   -American and Non--American patients are given   for each creatinine result.        Eos #   0.1       Eosinophil%   1.4       EP  10   10     FiO2  35   30     Glucose    104      Glucose, UA          Gran #   5.1       Gran%   78.7(H)       Group & Rh          Hematocrit   41.8       Hemoglobin   11.2(L)       Hyaline Casts, UA          INDIRECT BRITTANIE          Coumadin Monitoring INR          IP  24   20     Ketones, UA          Lactate, Mason          Leukocytes, UA          Lymph #   0.7(L)       Lymph%   10.8(L)       Magnesium    2.1      MCH   25.5(L)       MCHC   26.8(L)       MCV   95       Microscopic Comment          Min Vol  10.8   10.7     Mode  BiPAP   BiPAP     Mono #   0.5       Mono%   8.3       MPV   11.3       Myelocytes          Nitrite, UA          Non-Squam Epith          Occult Blood UA          Ovalocytes          Est. PA Systolic Pressure 68.89(A)         pH, UA          Phosphorus    2.1(L)      Platelet Estimate          Platelets   160       POC BE  >30(H)   30     POC HCO3  56.0(H)   55.8(H)     POC PCO2  98.1(HH)   109.6(HH)     POC PH  7.364   7.315(L)     POC PO2  82   58(LL)     POC SATURATED O2  94(L)   83(L)     POC TCO2  >50(H)   >50(H)     Poik          Poly          Potassium    4.7  Comment:  *Slightly Hemolyzed      Total Protein          Protein, UA          Protime          Provider Credentials:          Rate  20   20     RBC   4.40(L)       RBC, UA          RDW   16.6(H)       Sample  ARTERIAL   ARTERIAL     Sodium    141      Sp02  95   91     Specific Gravity, UA          Specimen UA           Spont Rate  24   27     Squam Epithel, UA          Stomatocytes          Tricuspid Valve Regurgitation MODERATE TO SEVERE(A)         Urobilinogen, UA          WBC Clumps, UA          WBC, UA          WBC   6.47                   07/09/17  1640 07/09/17  1558 07/09/17  1554 07/09/17  1530 07/09/17  1523      Time Notifed:     1525     Provider Notified:     GIULIA     Verbal Result Readback Performed     Yes     Albumin    3.3(L)          3.3(L)      Alkaline Phosphatase    67          67      Allens Test     N/A     ALT    15          15      Anion Gap    6(L)      Appearance, UA  Cloudy(A)        aPTT   22.4  Comment:  aPTT therapeutic range = 39-69 seconds       AST    13          13      Bacteria, UA  Rare        Baso #    CANCELED  Comment:  Result canceled by the ancillary      Basophil%    0.0      Bilirubin (UA)  Negative        Bilirubin, Direct    0.3      Total Bilirubin    0.5  Comment:  For infants and newborns, interpretation of results should be based  on gestational age, weight and in agreement with clinical  observations.  Premature Infant recommended reference ranges:  Up to 24 hours.............<8.0 mg/dL  Up to 48 hours............<12.0 mg/dL  3-5 days..................<15.0 mg/dL  6-29 days.................<15.0 mg/dL            0.5  Comment:  For infants and newborns, interpretation of results should be based  on gestational age, weight and in agreement with clinical  observations.  Premature Infant recommended reference ranges:  Up to 24 hours.............<8.0 mg/dL  Up to 48 hours............<12.0 mg/dL  3-5 days..................<15.0 mg/dL  6-29 days.................<15.0 mg/dL        BNP    84  Comment:  Values of less than 100 pg/ml are consistent with non-CHF populations.      Site     RR     BUN, Bld    14      Calcium    9.2      Calcium, Ion 0.79(L)         Chloride    92(L)      CO2    43  Comment:  *Critical value -   Results called to and read back byLIANE LANDON RN:  (HH)       Color, UA  Yellow        Creatinine    1.1      Diastolic Dysfunction          DelSys     CPAP/BiPAP     Differential Method    Manual      EF          eGFR if     >60.0      eGFR if non     >60.0  Comment:  Calculation used to obtain the estimated glomerular filtration  rate (eGFR) is the CKD-EPI equation. Since race is unknown   in our information system, the eGFR values for   -American and Non--American patients are given   for each creatinine result.        Eos #    CANCELED  Comment:  Result canceled by the ancillary      Eosinophil%    0.0      EP     10     FiO2     30     Glucose    157(H)      Glucose, UA  Negative        Gran #          Gran%    87.0(H)      Group & Rh    O POS      Hematocrit    43.8      Hemoglobin    11.9(L)      Hyaline Casts, UA  4(A)        INDIRECT BRITTANIE    NEG      Coumadin Monitoring INR   1.3  Comment:  Coumadin Therapy:  2.0 - 3.0 for INR for all indicators except mechanical heart valves  and antiphospholipid syndromes which should use 2.5 - 3.5.  (H)       IP     20     Ketones, UA  Negative        Lactate, Mason    0.8  Comment:  Falsely low lactic acid results can be found in samples   containing >=13.0 mg/dL total bilirubin and/or >=3.5 mg/dL   direct bilirubin.        Leukocytes, UA  2+(A)        Lymph #    CANCELED  Comment:  Result canceled by the ancillary      Lymph%    5.0(L)      Magnesium    2.0      MCH    26.0(L)      MCHC    27.2(L)      MCV    96      Microscopic Comment  SEE COMMENT  Comment:  Other formed elements not mentioned in the report are not   present in the microscopic examination.           Min Vol     12.5     Mode     BiPAP     Mono #    CANCELED  Comment:  Result canceled by the ancillary      Mono%    6.0      MPV    10.9      Myelocytes    2.0      Nitrite, UA  Negative        Non-Squam Epith  3(A)        Occult Blood UA  3+(A)        Ovalocytes    Occasional      Est. PA Systolic Pressure           pH, UA  5.0        Phosphorus    3.9      Platelet Estimate    Appears normal      Platelets    166      POC BE     24     POC HCO3     51.8(H)     POC PCO2     120.4(HH)     POC PH     7.242(LL)     POC PO2     63(L)     POC SATURATED O2     84(L)     POC TCO2     >50(H)     Poik    Slight      Poly    Occasional      Potassium    5.0      Total Protein    9.2(H)          9.2(H)      Protein, UA  2+  Comment:  Recommend a 24 hour urine protein or a urine   protein/creatinine ratio if globulin induced proteinuria is  clinically suspected.  (A)        Protime   13.3(H)       Provider Credentials:     MD     Rate     14     RBC    4.57(L)      RBC, UA  51(H)        RDW    16.3(H)      Sample     ARTERIAL     Sodium    141      Sp02     93     Specific Gravity, UA  1.020        Specimen UA  Urine, Catheterized        Spont Rate     12     Squam Epithel, UA  0        Stomatocytes    Present      Tricuspid Valve Regurgitation          Urobilinogen, UA  2.0        WBC Clumps, UA  Occasional(A)        WBC, UA  43(H)        WBC    8.54                      Significant Imaging:  I have reviewed all pertinent imaging results/findings within the past 24 hours.

## 2017-07-10 NOTE — NURSING TRANSFER
Nursing Transfer Note       7/10/2017     Transfer From: [facility]; To: [facility]    Transfer via bed    Transfer with 2 to O2    Transported by Tonio Espinoza, RN; Nino Duran RN    Medicines sent: N/A    Chart send with patient: Yes    Notified: Mother at bedside      Upon arrival to floor: patient oriented to room, call bell in reach and bed in lowest position, Notified Shelly of patient's arrival.  Patient's security envelope of $420 given to charge nurse Marcelina.  Patient in no acute distress during transfer.

## 2017-07-10 NOTE — PLAN OF CARE
Problem: Patient Care Overview  Goal: Plan of Care Review  Outcome: Ongoing (interventions implemented as appropriate)  No acute events throughout night, VS and assessment per flow sheet, patient progressing towards goals as tolerated, plan of care reviewed with Naeem Murray and family, all concerns addressed.  Bipap @ night.  Will continue to monitor.    CM-ICU  ABCDEF Early Mobility Inclusion Criteria Pass / Fail   M  Myocardial Stability  No dysrhythmia requiring new antidysrhythmic agent x 24 hours.   No evidence of active myocardial ischemia x 24 hours.   No femoral Impella, Tandem, or IABP. pass   O  Oxygenation  PEEP < 10 cm H2O.   FiO2 ? 60%.   SpO2 > 88%. pass   V  Vasopressors  No increase of any vasopressor x 2 hours.   No high dose vasopressors. pass   E  Engages to Voice  RASS > -3.   Responds to verbal stimulation. pass   Pass?  Passes all four criteria. Pass

## 2017-07-10 NOTE — ASSESSMENT & PLAN NOTE
- Patient on BiPAP 24/10 and 30 percent, satting at 92 percent  - Patient was on Lasix at home, denies taking for ~3 months Lasix at OSH - will do 1x lasix 40 mg   - given lasix 40 overnight with 1 L output  - will continue diuresis given cxr though difficult to interpret given body habitus  - Will start bipap Q4 hours on and Q4 hours off   - likely restrictive disease given obesity hypoventilation

## 2017-07-11 PROBLEM — M79.606 LEG PAIN: Status: ACTIVE | Noted: 2017-07-11

## 2017-07-11 LAB
ANION GAP SERPL CALC-SCNC: 18 MMOL/L
ANION GAP SERPL CALC-SCNC: 8 MMOL/L
BUN SERPL-MCNC: 16 MG/DL
BUN SERPL-MCNC: 16 MG/DL
CALCIUM SERPL-MCNC: 9.5 MG/DL
CALCIUM SERPL-MCNC: 9.7 MG/DL
CHLORIDE SERPL-SCNC: 88 MMOL/L
CHLORIDE SERPL-SCNC: 89 MMOL/L
CO2 SERPL-SCNC: 38 MMOL/L
CO2 SERPL-SCNC: 44 MMOL/L
CREAT SERPL-MCNC: 1.1 MG/DL
CREAT SERPL-MCNC: 1.2 MG/DL
EST. GFR  (AFRICAN AMERICAN): >60 ML/MIN/1.73 M^2
EST. GFR  (AFRICAN AMERICAN): >60 ML/MIN/1.73 M^2
EST. GFR  (NON AFRICAN AMERICAN): >60 ML/MIN/1.73 M^2
EST. GFR  (NON AFRICAN AMERICAN): >60 ML/MIN/1.73 M^2
GLUCOSE SERPL-MCNC: 115 MG/DL
GLUCOSE SERPL-MCNC: 121 MG/DL
MAGNESIUM SERPL-MCNC: 1.9 MG/DL
PHOSPHATE SERPL-MCNC: 2.5 MG/DL
POTASSIUM SERPL-SCNC: 4.4 MMOL/L
POTASSIUM SERPL-SCNC: 4.8 MMOL/L
SODIUM SERPL-SCNC: 140 MMOL/L
SODIUM SERPL-SCNC: 145 MMOL/L

## 2017-07-11 PROCEDURE — 84100 ASSAY OF PHOSPHORUS: CPT

## 2017-07-11 PROCEDURE — 20600001 HC STEP DOWN PRIVATE ROOM

## 2017-07-11 PROCEDURE — 25000003 PHARM REV CODE 250: Performed by: STUDENT IN AN ORGANIZED HEALTH CARE EDUCATION/TRAINING PROGRAM

## 2017-07-11 PROCEDURE — 36415 COLL VENOUS BLD VENIPUNCTURE: CPT

## 2017-07-11 PROCEDURE — 97535 SELF CARE MNGMENT TRAINING: CPT

## 2017-07-11 PROCEDURE — 97530 THERAPEUTIC ACTIVITIES: CPT

## 2017-07-11 PROCEDURE — 99900035 HC TECH TIME PER 15 MIN (STAT)

## 2017-07-11 PROCEDURE — 83735 ASSAY OF MAGNESIUM: CPT

## 2017-07-11 PROCEDURE — 99233 SBSQ HOSP IP/OBS HIGH 50: CPT | Mod: ,,, | Performed by: HOSPITALIST

## 2017-07-11 PROCEDURE — 97166 OT EVAL MOD COMPLEX 45 MIN: CPT

## 2017-07-11 PROCEDURE — 63600175 PHARM REV CODE 636 W HCPCS: Performed by: STUDENT IN AN ORGANIZED HEALTH CARE EDUCATION/TRAINING PROGRAM

## 2017-07-11 PROCEDURE — 25000003 PHARM REV CODE 250: Performed by: HOSPITALIST

## 2017-07-11 PROCEDURE — 97162 PT EVAL MOD COMPLEX 30 MIN: CPT

## 2017-07-11 PROCEDURE — 63600175 PHARM REV CODE 636 W HCPCS: Performed by: INTERNAL MEDICINE

## 2017-07-11 PROCEDURE — 80048 BASIC METABOLIC PNL TOTAL CA: CPT | Mod: 91

## 2017-07-11 RX ORDER — GABAPENTIN 300 MG/1
300 CAPSULE ORAL 3 TIMES DAILY
Status: DISCONTINUED | OUTPATIENT
Start: 2017-07-11 | End: 2017-07-14 | Stop reason: HOSPADM

## 2017-07-11 RX ORDER — ACETAMINOPHEN 500 MG
1000 TABLET ORAL EVERY 8 HOURS PRN
Status: DISCONTINUED | OUTPATIENT
Start: 2017-07-11 | End: 2017-07-14 | Stop reason: HOSPADM

## 2017-07-11 RX ADMIN — FUROSEMIDE 40 MG: 10 INJECTION, SOLUTION INTRAVENOUS at 12:07

## 2017-07-11 RX ADMIN — ENOXAPARIN SODIUM 40 MG: 100 INJECTION SUBCUTANEOUS at 04:07

## 2017-07-11 RX ADMIN — Medication 3 ML: at 09:07

## 2017-07-11 RX ADMIN — GABAPENTIN 300 MG: 300 CAPSULE ORAL at 01:07

## 2017-07-11 RX ADMIN — FUROSEMIDE 40 MG: 10 INJECTION, SOLUTION INTRAVENOUS at 09:07

## 2017-07-11 RX ADMIN — Medication 3 ML: at 06:07

## 2017-07-11 RX ADMIN — FUROSEMIDE 40 MG: 10 INJECTION, SOLUTION INTRAVENOUS at 06:07

## 2017-07-11 RX ADMIN — Medication 3 ML: at 12:07

## 2017-07-11 RX ADMIN — ACETAMINOPHEN 650 MG: 325 TABLET ORAL at 11:07

## 2017-07-11 RX ADMIN — GABAPENTIN 300 MG: 300 CAPSULE ORAL at 09:07

## 2017-07-11 NOTE — PROGRESS NOTES
[facility] Hospital Medicine  Progress Note    Patient Name: Naeem Murray  MRN: [id number]  Patient Class: IP- Inpatient   Admission Date: 7/9/2017  Length of Stay: 2 days  Attending Physician: Santa Ragsdale MD  Primary Care Provider: Provider [facility] Medicine Team: [facility]OSP MED B Santa Ragsdale MD    Subjective:     Principal Problem:Acute on chronic respiratory failure with hypercapnia    HPI:  42 year old male who presents as a transfer from [facility] where he had been admitted for hypercapneic respiratory failure on 7/6/16. He has a PMHx of CHF, pulmonary hypertension, cellulitis, hypertension and morbid obesity (now ~650lbs down from 780). On the evening of 7/5/17 Mr. Murray has worsening SOB which led him to be transported to [facility]. Until 3 months ago, Mr. Murray was mobilizing without assistance and able to climb a few stairs into his home but from March 2017 on he has become permanently bed bound due to leg pain. He was taking Percocet for leg pain, ran out and didn't return to the physician for any of his prescriptions to be refilled. He utilizes CPAP during sleep and requires home oxygen at 4L now, which he is complaint with. Per his mother Berenice he has been dealing with these respiratory issues for 11 years. She also endorses that he has ceased his at home medications since becoming bed bound.             Hospital Course:  Hospital/ICU Course:  7/9/2017 - patient on BiPAP of 20/10, satting well, CMP with Bicarb of 43    Interval History: happy    Review of Systems   Constitutional: Negative for appetite change and fatigue.        Pt talkatve and pleasant.      HENT: Negative for congestion and trouble swallowing.    Respiratory: Negative for cough, shortness of breath and wheezing.    Cardiovascular: Negative for chest pain and leg swelling.   Gastrointestinal: Negative for abdominal pain, constipation, diarrhea, nausea and vomiting.    Genitourinary: Negative for difficulty urinating and dysuria.   Musculoskeletal: Negative for arthralgias and back pain.   Skin: Negative for rash and wound.   Neurological: Negative for dizziness, speech difficulty, light-headedness and headaches.   Psychiatric/Behavioral: Negative for agitation and behavioral problems.     Objective:     Vital Signs (Most Recent):  Temp: 98.6 °F (37 °C) (07/11/17 0828)  Pulse: 84 (07/11/17 0828)  Resp: (!) 22 (07/11/17 0828)  BP: (!) 146/68 (07/11/17 0828)  SpO2: 99 % (07/11/17 0828) Vital Signs (24h Range):  Temp:  [97.8 °F (36.6 °C)-99.8 °F (37.7 °C)] 98.6 °F (37 °C)  Pulse:  [84-95] 84  Resp:  [20-29] 22  SpO2:  [85 %-99 %] 99 %  BP: (114-146)/(55-69) 146/68     Weight: (!) 294.8 kg (650 lb)  Body mass index is 85.76 kg/m².    Intake/Output Summary (Last 24 hours) at 07/11/17 1313  Last data filed at 07/11/17 0900   Gross per 24 hour   Intake                0 ml   Output             4620 ml   Net            -4620 ml      Physical Exam   Constitutional: He is oriented to person, place, and time. He appears well-developed and well-nourished.   smiling   HENT:   Head: Normocephalic.   Mouth/Throat: Oropharynx is clear and moist.   Eyes: EOM are normal. Pupils are equal, round, and reactive to light. No scleral icterus.   Neck: Normal range of motion and full passive range of motion without pain. Neck supple. No JVD present. Carotid bruit is not present. No tracheal deviation, no edema and normal range of motion present. No thyromegaly present.   Cardiovascular: Normal rate, regular rhythm, normal heart sounds and intact distal pulses.  Exam reveals no gallop and no friction rub.    No murmur heard.  Pulmonary/Chest: Effort normal and breath sounds normal. No accessory muscle usage or stridor. No apnea. No respiratory distress. He has no wheezes. He has no rales. He exhibits no tenderness.   Abdominal: Soft. Bowel sounds are normal. He exhibits no distension, no ascites and no  mass. There is no tenderness. There is no rebound and no guarding.   Musculoskeletal: Normal range of motion. He exhibits no edema or tenderness.   Lymphadenopathy:        Head (right side): No posterior auricular adenopathy present.     He has no cervical adenopathy.   Neurological: He is alert and oriented to person, place, and time. He has normal strength. He displays normal reflexes.   Skin: Skin is warm and dry. No abrasion, no bruising, no ecchymosis, no laceration and no rash noted. No cyanosis or erythema. No pallor. Nails show no clubbing.   Psychiatric: He has a normal mood and affect. His behavior is normal. Judgment and thought content normal.       Significant Labs:   CBC:   Recent Labs  Lab 07/09/17  1530 07/10/17  0537   WBC 8.54 6.47   HGB 11.9* 11.2*   HCT 43.8 41.8    160     CMP:   Recent Labs  Lab 07/09/17  1530 07/10/17  0429 07/10/17  1613 07/11/17  0420    141 142 145   K 5.0 4.7 4.2 4.8   CL 92* 91* 89* 89*   CO2 43* 45* 46* 38*   * 104 108 115*   BUN 14 15 15 16   CREATININE 1.1 1.0 1.1 1.1   CALCIUM 9.2 9.2 9.1 9.7   PROT 9.2*  9.2*  --   --   --    ALBUMIN 3.3*  3.3*  --   --   --    BILITOT 0.5  0.5  --   --   --    ALKPHOS 67  67  --   --   --    AST 13  13  --   --   --    ALT 15  15  --   --   --    ANIONGAP 6* 5* 7* 18*   EGFRNONAA >60.0 >60.0 >60.0 >60.0           Assessment/Plan:      * Acute on chronic respiratory failure with hypercapnia    Stable on 4 liters  willl need home bipap  Hypercapnic - increased risk of death with opioids          Leg pain    OA right knee and right hip per xray  Tylenol  Opioids not indicated for chronic pain and contra-indcated with chronic resp failure and hypercapnea, unless end of life care desired.  Tylenol 1000 q 8 prn, , neurontin 300 tid        Chronic respiratory acidosis    Hypoercapnia,           Morbid obesity    With pulm HTM          Congestive heart failure    stable          Pulmonary hypertension               Hypoalbuminemia    Alb 3.3            VTE Risk Mitigation         Ordered     enoxaparin injection 40 mg  Every 12 hours (non-standard times)     Route:  Subcutaneous        07/09/17 1539     High Risk of VTE  Once      07/09/17 1539          Santa Ragsdale MD  Department of Hospital Medicine   [facility]

## 2017-07-11 NOTE — PLAN OF CARE
Problem: Physical Therapy Goal  Goal: Physical Therapy Goal  Goals to be met by: 2017     Patient will increase functional independence with mobility by performin. Supine to sit with Contact Guard Assistance  2. Sit to supine with MInimal Assistance  3. Rolling to Left and Right with Modified [place].  4. Wheelchair propulsion x50 feet with Modified [place] using bilateral uppper extremities and KATHERINE LE  5. Sitting at edge of bed x20 minutes with Modified [place]  6. Lower extremity exercise program x20 reps per handout, with assistance as needed for increased strength and endurance to increase safety and independence with above mobility goals.    Goals established

## 2017-07-11 NOTE — ASSESSMENT & PLAN NOTE
OA right knee and right hip per xray  Tylenol  Opioids not indicated for chronic pain and contra-indcated with chronic resp failure and hypercapnea, unless end of life care desired.  Tylenol 1000 q 8 prn, , neurontin 300 tid

## 2017-07-11 NOTE — PLAN OF CARE
Problem: Patient Care Overview  Goal: Plan of Care Review  Outcome: Ongoing (interventions implemented as appropriate)  AAO x 4.  On BiPAP while asleep.  Spoke with Dr. Rojo with critical care to clarify orders.  Keep sats between 88 and 92%.  Titrate O2 and/or FiO2 in order to maintain.  No need for accuchecks at this time.  Lovenox 40 mg BID is sufficient DVT prophylaxis for patient weighing 650 lbs.  RUE midline flushes well and draws back.  Dressing changed.  Pt is on a bariatric bed with Mighty Air Mattress for pressure relief and HAPI prevention.  He is able to make slight adjustments in body positions independently.  Xray orders for right femur and tibia/fibula for AM.  Hines out at 0420.  Removed from BiPAP and placed on NC/4L, sat 90%.

## 2017-07-11 NOTE — PT/OT/SLP EVAL
"Occupational Therapy  Evaluation and treatment    Naeem Murray   MRN: [id number]   Admitting Diagnosis: Acute on chronic respiratory failure with hypercapnia    OT Date of Treatment: 07/11/17   OT Start Time: 1100  OT Stop Time: 1136  OT Total Time (min): 36 min    Billable Minutes:  Evaluation 20  Self Care/Home Management 16    Diagnosis: Acute on chronic respiratory failure with hypercapnia     Past Medical History:   Diagnosis Date    Anticoagulant long-term use     CHF (congestive heart failure)     Hypertension       Past Surgical History:   Procedure Laterality Date    ABDOMINAL SURGERY      FEMUR SURGERY         Referring physician: Marlon Clarke MD  Date referred to OT: 7-10-17    General Precautions: Standard, fall  Orthopedic Precautions: N/A  Braces: N/A    Do you have any cultural, spiritual, Pentecostal conflicts, given your current situation?: none stated     Patient History:  Living Environment  Lives With: parent(s)  Living Arrangements: mobile home  Home Accessibility: stairs to enter home  Home Layout: Able to live on 1st floor  Number of Stairs to Enter Home: 4  Transportation Available: family or friend will provide  Living Environment Comment: Pt lives with his mother in a mobile home and has been bed bound since aproximately March 2017  Equipment Currently Used at Home: bath bench, walker, rolling, oxygen, CPAP, wheelchair    Prior level of function:   Bed Mobility/Transfers: needs assist  Grooming: needs assist  Bathing: needs assist  Upper Body Dressing: needs assist  Lower Body Dressing: needs assist  Toileting: needs assist  Home Management Skills: needs assist  Driving License: Yes  Mode of Transportation: Family, Friends     Dominant hand: right    Subjective:  Communicated with RN prior to session.  "I need to get my legs fixed"  Chief Complaint: pain  Patient/Family stated goals: "fix my leg"    Pain/Comfort  Pain Rating 1: 8/10  Location - Side 1: Bilateral  Location - " Orientation 1: generalized  Location 1: leg  Pain Addressed 1: Reposition, Distraction, Cessation of Activity  Pain Rating Post-Intervention 1: 8/10    Objective:  Patient found with: peripheral IV, oxygen    Cognitive Exam:  Oriented to: Person, Place, Time and Situation  Follows Commands/attention: Follows multistep  commands  Communication: clear/fluent  Memory:  No Deficits noted  Safety awareness/insight to disability: impaired  Coping skills/emotional control: Appropriate to situation    Visual/perceptual:  Intact    Physical Exam:  Postural examination/scapula alignment: No postural abnormalities identified  Skin integrity: Visible skin intact  Edema: None noted     Sensation:   Intact    Upper Extremity Range of Motion:  Right Upper Extremity: WNL  Left Upper Extremity: WNL    Upper Extremity Strength:  Right Upper Extremity: WNL  Left Upper Extremity: WNL   Strength: WNL    Fine motor coordination:   Intact    Gross motor coordination: WFL    Functional Mobility:  Bed Mobility:  Rolling/Turning Right: Minimum assistance, With side rail  Scooting/Bridging: Minimum Assistance, With side rail (to scoot to HOB )  Supine to Sit: WIth side rail, Minimum Assistance  Sit to Supine: Maximum Assistance (to bring legs onto bed)    Transfers:  Sit <> Stand Assistance: Activity did not occur    Functional Ambulation: N/A    Activities of Daily Living:  Feeding Level of Assistance: Activity did not occur  UE Dressing Level of Assistance: Moderate assistance (to doff and christiano gown in supine; Pt required assist to pull gown out from under his arms and trunk )  LE Dressing Level of Assistance: Total assistance (to christiano socks)  Grooming Position: other (supine in bed)  Grooming Level of Assistance: Stand by assistance    Balance:   Static Sit: FAIR+: Able to take MINIMAL challenges from all directions  Dynamic Sit: FAIR+: Maintains balance through MINIMAL excursions of active trunk motion  Static Stand:  "N/A    Therapeutic Activities and Exercises:  OT evaluation performed.  Pt educated on role of OT, safety with functional mobility and ADLs      AM-PAC 6 CLICK ADL  How much help from another person does this patient currently need?  1 = Unable, Total/Dependent Assistance  2 = A lot, Maximum/Moderate Assistance  3 = A little, Minimum/Contact Guard/Supervision  4 = None, Modified [place]/Independent    Putting on and taking off regular lower body clothing? : 1  Bathing (including washing, rinsing, drying)?: 1  Toileting, which includes using toilet, bedpan, or urinal? : 2  Putting on and taking off regular upper body clothing?: 2  Taking care of personal grooming such as brushing teeth?: 3  Eating meals?: 4  Total Score: 13    AM-PAC Raw Score CMS "G-Code Modifier Level of Impairment Assistance   6 % Total / Unable   7 - 9 CM 80 - 100% Maximal Assist   10-14 CL 60 - 80% Moderate Assist   15 - 19 CK 40 - 60% Moderate Assist   20 - 22 CJ 20 - 40% Minimal Assist   23 CI 1-20% SBA / CGA   24 CH 0% Independent/ Mod I       Patient left HOB elevated with all lines intact and call button in reach    Assessment:  Naeem Murray is a 42 y.o. male with a medical diagnosis of Acute on chronic respiratory failure with hypercapnia and presents with  presents with performance deficits of Physical skills including impaired balance, functional mobility, functional endurance, and decreased functional use of (B)  LE. Pt is motivated and would benefit from OT intervention to further his functional (I)ce and safety.    Pt presented with a moderate complexity OT evaluation.  Pt required an expanded an expanded review of medical history and occupational profile.  Pt demod 3-5 performance deficits (physical, cognitive, or psychosocial) resulting in limitations and engagement restrictions.  Clinical decision making required moderate analytical complexity with several treatment options.  Pt with possible comorbidities and " required minimal to moderate modification of task/assistance with assessment.      Physical- skills refer to impairments of body structure or functions, balance, mobility; strength, endurance, FMC, GMC, sensation, dexterity, and posture.  Cognitive- skills refer to ability to attend, communicate, perceive, think, understand, problem solve, mentally sequence, learn, and remember resulting in ability to organize occupational performance in timely and safe manner.    Psychosocial- skills refer to interpersonal interactions, habits, routines, and behaviors, active use of coping strategies, and environmental adaptations to appropriately participate in everyday tasks and situations.   .    Rehab identified problem list/impairments: Rehab identified problem list/impairments: weakness, impaired endurance, impaired self care skills, decreased ROM, pain, decreased lower extremity function, impaired balance, impaired functional mobilty    Rehab potential is good.    Activity tolerance: Fair    Discharge recommendations: Discharge Facility/Level Of Care Needs: rehabilitation facility     Barriers to discharge: Barriers to Discharge: Inaccessible home environment    Equipment recommendations:  (TBD)     GOALS:    Occupational Therapy Goals        Problem: Occupational Therapy Goal    Goal Priority Disciplines Outcome Interventions   Occupational Therapy Goal     OT, PT/OT Ongoing (interventions implemented as appropriate)    Description:  Goals to be met by: 7-18-17     Patient will increase functional independence with ADLs by performing:    UE Dressing with Stand-by Assistance.  LE Dressing with Maximum Assistance.  Grooming while EOB with Supervision.  Rolling to Bilateral with Stand-by Assistance.   Supine to sit with Stand-by Assistance.                      PLAN:  Patient to be seen 3 x/week to address the above listed problems via self-care/home management, therapeutic activities, therapeutic exercises  Plan of Care  expires: 08/10/17  Plan of Care reviewed with: patient, mother         Dayanna RYAN [place]  07/11/2017

## 2017-07-11 NOTE — PT/OT/SLP EVAL
"Physical Therapy  Evaluation    Naeem Murray   MRN: [id number]   Admitting Diagnosis: Acute on chronic respiratory failure with hypercapnia    PT Received On: 07/11/17  PT Start Time: 1100     PT Stop Time: 1135    PT Total Time (min): 35 min       Billable Minutes:  Evaluation 25 and Therapeutic Activity 10    Diagnosis: Acute on chronic respiratory failure with hypercapnia Morbid obesity  impaired mobility    Past Medical History:   Diagnosis Date    Anticoagulant long-term use     CHF (congestive heart failure)     Hypertension       Past Surgical History:   Procedure Laterality Date    ABDOMINAL SURGERY      FEMUR SURGERY         Referring physician: Marlon Clarke MD  Date referred to PT: 7/10/2017    General Precautions: Standard, fall  Orthopedic Precautions: N/A   Braces: N/A       Do you have any cultural, spiritual, Cheondoism conflicts, given your current situation?: none given    Patient History:  Lives With: parent(s)  Living Arrangements: mobile home  Home Accessibility: stairs to enter home  Home Layout: Able to live on 1st floor  Number of Stairs to Enter Home: 4  Transportation Available: family or friend will provide  Living Environment Comment: Pt lives with mother in mobilie home. Pt reports being bedbound for 3-4 months due to leg pain.   Equipment Currently Used at Home: bath bench, wheelchair, walker, rolling, CPAP, oxygen  DME owned (not currently used): none    Previous Level of Function:  Ambulation Skills: unable to perform  Transfer Skills: unable to perform  ADL Skills: unable to perform  Work/Leisure Activity: unable to perform    Subjective:  Communicated with Dina ceja prior to session.  "I haven't been able to get in my wheelchair for 3-4 months" "This one's really been hurting (pointing at RLE) but this one takes the cake (indicating LLE)"  Chief Complaint: pain in KATHERINE LE  Patient goals: return to PLOF with rehab. "I did really good there, I got walking " "again"    Pain/Comfort  Pain Rating 1: 8/10  Location - Side 1: Bilateral  Location 1: leg  Pain Addressed 1: Reposition, Distraction, Pre-medicate for activity, Cessation of Activity, Nurse notified  Pain Rating Post-Intervention 1: 8/10      Objective:   Patient found with: oxygen, peripheral IV     Cognitive Exam:  Oriented to: Person, Place, Time and Situation    Follows Commands/attention: Follows two-step commands  Communication: clear/fluent  Safety awareness/insight to disability: intact    Physical Exam:  Postural examination/scapula alignment: Rounded shoulder and Head forward    Skin integrity: Dry  Edema: Mild KATHERINE LE      Upper Extremity Range of Motion:  Right Upper Extremity: WFL  Left Upper Extremity: WFL    Upper Extremity Strength:  Right Upper Extremity: WFL  Left Upper Extremity: WFL    Lower Extremity Range of Motion: Pt only able to move through partial ROM on KATHERINE LE due to pain      Lower Extremity Strength: unable to test 2/2 pain unable to move through full available ROM against gravity       Functional Mobility:  Bed Mobility:  Rolling/Turning to Left: Minimum assistance, With side rail  Scooting/Bridging: Minimum Assistance  Supine to Sit: Minimum Assistance, With side rail  Sit to Supine: Maximum Assistance, With siderail    Transfers:  Sit <> Stand Assistance: Activity did not occur    Gait:   Gait Distance: unable to attempt. unable to tolerate sitting for 2 min due to dizziness.        Balance:   Static Sit: FAIR-: Maintains without assist but inconsistent   Dynamic Sit: FAIR+: Maintains balance through MINIMAL excursions of active trunk motion      Therapeutic Activities and Exercises:  Evaluation: Bed mobility with head of bed flat but heavy use of rails and PT as grab bar to pull self up to sitting  And to EOB.  Pt only able to tolerate sitting EOB ~1.5 min reporting dizziness. Pt instructed to focus on one spot.  Pt reports he has not sat up in 3 months.  SIt to supine with max A " with KATHERINE LE into bed. Pt able to use UE and headboard to pull self up to head of bed.  Pt educated to sit up with bed in chair position throughout day to increase upright tolerance.  HEP reviewed with PT. Pt instructed to begin exercises 2-3 times per day to increase activity tolerance, strength.  POC, role of PT and white board updated.    AM-PAC 6 CLICK MOBILITY  How much help from another person does this patient currently need?   1 = Unable, Total/Dependent Assistance  2 = A lot, Maximum/Moderate Assistance  3 = A little, Minimum/Contact Guard/Supervision  4 = None, Modified [place]/Independent    Turning over in bed (including adjusting bedclothes, sheets and blankets)?: 3  Sitting down on and standing up from a chair with arms (e.g., wheelchair, bedside commode, etc.): 1  Moving from lying on back to sitting on the side of the bed?: 2  Moving to and from a bed to a chair (including a wheelchair)?: 1  Need to walk in hospital room?: 1  Climbing 3-5 steps with a railing?: 1  Total Score: 9     AM-PAC Raw Score CMS G-Code Modifier Level of Impairment Assistance   6 % Total / Unable   7 - 9 CM 80 - 100% Maximal Assist   10 - 14 CL 60 - 80% Moderate Assist   15 - 19 CK 40 - 60% Moderate Assist   20 - 22 CJ 20 - 40% Minimal Assist   23 CI 1-20% SBA / CGA   24 CH 0% Independent/ Mod I     Patient left with bed in chair position with all lines intact, call button in reach and nursing notified.    Assessment:   Naeem Murray is a 42 y.o. male with a medical diagnosis of Acute on chronic respiratory failure with hypercapnia and presents with impaired mobility. Pt with decreased activity tolerance only able to tolerate EOB for 1.5 min. Pt currently needing assist with all functional mobility. Pt will benefit from rehab after medically stable to return to PLOF (3-4 months ago pt reporting able to walk in home and perform 4 steps into and out of home) and to prior living situation safely    Rehab identified  problem list/impairments: Rehab identified problem list/impairments: weakness, decreased lower extremity function, decreased ROM, impaired endurance, pain, impaired self care skills, impaired functional mobilty, edema, other (comment) (morbid obesity)    Rehab potential is good.    Activity tolerance: Fair    Discharge recommendations: Discharge Facility/Level Of Care Needs: rehabilitation facility     Barriers to discharge: Barriers to Discharge: Inaccessible home environment    Equipment recommendations: Equipment Needed After Discharge:  (TBD)     GOALS:    Physical Therapy Goals        Problem: Physical Therapy Goal    Goal Priority Disciplines Outcome Goal Variances Interventions   Physical Therapy Goal     PT/OT, PT      Description:  Goals to be met by: 2017     Patient will increase functional independence with mobility by performin. Supine to sit with Contact Guard Assistance  2. Sit to supine with MInimal Assistance  3. Rolling to Left and Right with Modified [place].  4. Wheelchair propulsion x50 feet with Modified [place] using bilateral uppper extremities and KATHERINE LE  5. Sitting at edge of bed x20 minutes with Modified [place]  6. Lower extremity exercise program x20 reps per handout, with assistance as needed for increased strength and endurance to increase safety and independence with above mobility goals.                      PLAN:    Patient to be seen 3 x/week to address the above listed problems via therapeutic activities, therapeutic exercises, neuromuscular re-education  Plan of Care expires: 17  Plan of Care reviewed with: patient, mother          Silvia LYONS Suman, PT  2017

## 2017-07-11 NOTE — PLAN OF CARE
Problem: Occupational Therapy Goal  Goal: Occupational Therapy Goal  Goals to be met by: 7-18-17     Patient will increase functional independence with ADLs by performing:    UE Dressing with Stand-by Assistance.  LE Dressing with Maximum Assistance.  Grooming while EOB with Supervision.  Rolling to Bilateral with Stand-by Assistance.   Supine to sit with Stand-by Assistance.    Outcome: Ongoing (interventions implemented as appropriate)  OT eval performed, POC and goals set.    RYAN [place]  7/11/2017

## 2017-07-11 NOTE — SUBJECTIVE & OBJECTIVE
Interval History: happy    Review of Systems   Constitutional: Negative for appetite change and fatigue.        Pt talkatve and pleasant.      HENT: Negative for congestion and trouble swallowing.    Respiratory: Negative for cough, shortness of breath and wheezing.    Cardiovascular: Negative for chest pain and leg swelling.   Gastrointestinal: Negative for abdominal pain, constipation, diarrhea, nausea and vomiting.   Genitourinary: Negative for difficulty urinating and dysuria.   Musculoskeletal: Negative for arthralgias and back pain.   Skin: Negative for rash and wound.   Neurological: Negative for dizziness, speech difficulty, light-headedness and headaches.   Psychiatric/Behavioral: Negative for agitation and behavioral problems.     Objective:     Vital Signs (Most Recent):  Temp: 98.6 °F (37 °C) (07/11/17 0828)  Pulse: 84 (07/11/17 0828)  Resp: (!) 22 (07/11/17 0828)  BP: (!) 146/68 (07/11/17 0828)  SpO2: 99 % (07/11/17 0828) Vital Signs (24h Range):  Temp:  [97.8 °F (36.6 °C)-99.8 °F (37.7 °C)] 98.6 °F (37 °C)  Pulse:  [84-95] 84  Resp:  [20-29] 22  SpO2:  [85 %-99 %] 99 %  BP: (114-146)/(55-69) 146/68     Weight: (!) 294.8 kg (650 lb)  Body mass index is 85.76 kg/m².    Intake/Output Summary (Last 24 hours) at 07/11/17 1313  Last data filed at 07/11/17 0900   Gross per 24 hour   Intake                0 ml   Output             4620 ml   Net            -4620 ml      Physical Exam   Constitutional: He is oriented to person, place, and time. He appears well-developed and well-nourished.   smiling   HENT:   Head: Normocephalic.   Mouth/Throat: Oropharynx is clear and moist.   Eyes: EOM are normal. Pupils are equal, round, and reactive to light. No scleral icterus.   Neck: Normal range of motion and full passive range of motion without pain. Neck supple. No JVD present. Carotid bruit is not present. No tracheal deviation, no edema and normal range of motion present. No thyromegaly present.   Cardiovascular:  Normal rate, regular rhythm, normal heart sounds and intact distal pulses.  Exam reveals no gallop and no friction rub.    No murmur heard.  Pulmonary/Chest: Effort normal and breath sounds normal. No accessory muscle usage or stridor. No apnea. No respiratory distress. He has no wheezes. He has no rales. He exhibits no tenderness.   Abdominal: Soft. Bowel sounds are normal. He exhibits no distension, no ascites and no mass. There is no tenderness. There is no rebound and no guarding.   Musculoskeletal: Normal range of motion. He exhibits no edema or tenderness.   Lymphadenopathy:        Head (right side): No posterior auricular adenopathy present.     He has no cervical adenopathy.   Neurological: He is alert and oriented to person, place, and time. He has normal strength. He displays normal reflexes.   Skin: Skin is warm and dry. No abrasion, no bruising, no ecchymosis, no laceration and no rash noted. No cyanosis or erythema. No pallor. Nails show no clubbing.   Psychiatric: He has a normal mood and affect. His behavior is normal. Judgment and thought content normal.       Significant Labs:   CBC:   Recent Labs  Lab 07/09/17  1530 07/10/17  0537   WBC 8.54 6.47   HGB 11.9* 11.2*   HCT 43.8 41.8    160     CMP:   Recent Labs  Lab 07/09/17  1530 07/10/17  0429 07/10/17  1613 07/11/17  0420    141 142 145   K 5.0 4.7 4.2 4.8   CL 92* 91* 89* 89*   CO2 43* 45* 46* 38*   * 104 108 115*   BUN 14 15 15 16   CREATININE 1.1 1.0 1.1 1.1   CALCIUM 9.2 9.2 9.1 9.7   PROT 9.2*  9.2*  --   --   --    ALBUMIN 3.3*  3.3*  --   --   --    BILITOT 0.5  0.5  --   --   --    ALKPHOS 67  67  --   --   --    AST 13  13  --   --   --    ALT 15  15  --   --   --    ANIONGAP 6* 5* 7* 18*   EGFRNONAA >60.0 >60.0 >60.0 >60.0

## 2017-07-11 NOTE — NURSING TRANSFER
Nursing Transfer Note      7/10/2017     Transfer to 1012    Transfer via bariatric bed     Transfer with NC to O2    Transported by RN    Patient reassessed at: 7/10/17 at 1800    Upon arrival to floor: patient oriented to room, call bell in reach and bed in lowest position        Pt transferred to hercules bed using lift in the room and 6 people. 4L NC applied; called respiratory therapy to request bipap set up. Pt complains of bilateral leg pain, positioning supports utilized. VS stable, no distress noted.

## 2017-07-11 NOTE — HOSPITAL COURSE
"Hospital/ICU Course:  7/9/2017 - patient on BiPAP of 20/10, satting well, CMP with Bicarb of 43  7/12 - sleeping well and tolerating bipap,  Discussed leg pain, xrays + OA, opiods not indicated, discuuse risk of death with hyppoercapnea and gave hime ' Safe Use and SE of Opioids"    7/13 - pt happy, mother in room, spoke with them and w sister over facetime.  Discussed his medical problems: pulmonary HTN, CHF,  bariatric surgery, and OA of the hip and knee.  Pt ready for rehab unit.  Pt not in pain.   7/14 - Slept well, comfortable.  Understands there is too much risk of respiratory suppression for opioid therapy.  I reviewed meds, labs and echo results  and discharge plans w patient ans his mother,  Pt need f/u after rehab w bariatric surgery.   "

## 2017-07-11 NOTE — HPI
42 year old male who presents as a transfer from [facility] where he had been admitted for hypercapneic respiratory failure on 7/6/16. He has a PMHx of CHF, pulmonary hypertension, cellulitis, hypertension and morbid obesity (now ~650lbs down from 780). On the evening of 7/5/17 Mr. Murray has worsening SOB which led him to be transported to [facility]. Until 3 months ago, Mr. Murray was mobilizing without assistance and able to climb a few stairs into his home but from March 2017 on he has become permanently bed bound due to leg pain. He was taking Percocet for leg pain, ran out and didn't return to the physician for any of his prescriptions to be refilled. He utilizes CPAP during sleep and requires home oxygen at 4L now, which he is complaint with. Per his mother Berenice he has been dealing with these respiratory issues for 11 years. She also endorses that he has ceased his at home medications since becoming bed bound.

## 2017-07-11 NOTE — PLAN OF CARE
Problem: Pressure Ulcer Risk (Baljinder Scale) (Adult,Obstetrics,Pediatric)  Goal: Skin Integrity  Patient will demonstrate the desired outcomes by discharge/transition of care.   Outcome: Ongoing (interventions implemented as appropriate)  Pt encouraged to turn and move in bed throughout shift. Specialty bed extenders and supports to be delivered by [employer] per BOB Desai at [employer]. Tylenol administered for pain x 1 during shift. Pt voiding per urinal throughout shift with adequate output. Pt free from falls and injury. PT worked with patient to sit at side of bed.

## 2017-07-12 PROBLEM — Z75.8 DISCHARGE PLANNING ISSUES: Status: ACTIVE | Noted: 2017-07-12

## 2017-07-12 LAB
ANION GAP SERPL CALC-SCNC: 5 MMOL/L
ANION GAP SERPL CALC-SCNC: 9 MMOL/L
BUN SERPL-MCNC: 17 MG/DL
BUN SERPL-MCNC: 18 MG/DL
CALCIUM SERPL-MCNC: 9.3 MG/DL
CALCIUM SERPL-MCNC: 9.4 MG/DL
CHLORIDE SERPL-SCNC: 87 MMOL/L
CHLORIDE SERPL-SCNC: 88 MMOL/L
CO2 SERPL-SCNC: 44 MMOL/L
CO2 SERPL-SCNC: 49 MMOL/L
CREAT SERPL-MCNC: 1.2 MG/DL
CREAT SERPL-MCNC: 1.3 MG/DL
EST. GFR  (AFRICAN AMERICAN): >60 ML/MIN/1.73 M^2
EST. GFR  (AFRICAN AMERICAN): >60 ML/MIN/1.73 M^2
EST. GFR  (NON AFRICAN AMERICAN): >60 ML/MIN/1.73 M^2
EST. GFR  (NON AFRICAN AMERICAN): >60 ML/MIN/1.73 M^2
GLUCOSE SERPL-MCNC: 111 MG/DL
GLUCOSE SERPL-MCNC: 129 MG/DL
MAGNESIUM SERPL-MCNC: 1.8 MG/DL
PHOSPHATE SERPL-MCNC: 3.6 MG/DL
POTASSIUM SERPL-SCNC: 4 MMOL/L
POTASSIUM SERPL-SCNC: 4.3 MMOL/L
SODIUM SERPL-SCNC: 141 MMOL/L
SODIUM SERPL-SCNC: 141 MMOL/L

## 2017-07-12 PROCEDURE — 84100 ASSAY OF PHOSPHORUS: CPT

## 2017-07-12 PROCEDURE — 20600001 HC STEP DOWN PRIVATE ROOM

## 2017-07-12 PROCEDURE — 63600175 PHARM REV CODE 636 W HCPCS: Performed by: STUDENT IN AN ORGANIZED HEALTH CARE EDUCATION/TRAINING PROGRAM

## 2017-07-12 PROCEDURE — 94660 CPAP INITIATION&MGMT: CPT

## 2017-07-12 PROCEDURE — 94640 AIRWAY INHALATION TREATMENT: CPT

## 2017-07-12 PROCEDURE — 25000242 PHARM REV CODE 250 ALT 637 W/ HCPCS: Performed by: STUDENT IN AN ORGANIZED HEALTH CARE EDUCATION/TRAINING PROGRAM

## 2017-07-12 PROCEDURE — 63600175 PHARM REV CODE 636 W HCPCS: Performed by: INTERNAL MEDICINE

## 2017-07-12 PROCEDURE — 25000003 PHARM REV CODE 250: Performed by: STUDENT IN AN ORGANIZED HEALTH CARE EDUCATION/TRAINING PROGRAM

## 2017-07-12 PROCEDURE — 36415 COLL VENOUS BLD VENIPUNCTURE: CPT

## 2017-07-12 PROCEDURE — 99232 SBSQ HOSP IP/OBS MODERATE 35: CPT | Mod: ,,, | Performed by: NURSE PRACTITIONER

## 2017-07-12 PROCEDURE — 83735 ASSAY OF MAGNESIUM: CPT

## 2017-07-12 PROCEDURE — 80048 BASIC METABOLIC PNL TOTAL CA: CPT

## 2017-07-12 PROCEDURE — 99900035 HC TECH TIME PER 15 MIN (STAT)

## 2017-07-12 PROCEDURE — 99233 SBSQ HOSP IP/OBS HIGH 50: CPT | Mod: ,,, | Performed by: HOSPITALIST

## 2017-07-12 PROCEDURE — 27000221 HC OXYGEN, UP TO 24 HOURS

## 2017-07-12 PROCEDURE — 25000003 PHARM REV CODE 250: Performed by: HOSPITALIST

## 2017-07-12 PROCEDURE — 94760 N-INVAS EAR/PLS OXIMETRY 1: CPT

## 2017-07-12 PROCEDURE — 94761 N-INVAS EAR/PLS OXIMETRY MLT: CPT

## 2017-07-12 RX ORDER — MICONAZOLE NITRATE 2 %
POWDER (GRAM) TOPICAL 2 TIMES DAILY
Status: CANCELLED | OUTPATIENT
Start: 2017-07-12

## 2017-07-12 RX ORDER — FUROSEMIDE 40 MG/1
40 TABLET ORAL 2 TIMES DAILY
Status: DISCONTINUED | OUTPATIENT
Start: 2017-07-12 | End: 2017-07-14 | Stop reason: HOSPADM

## 2017-07-12 RX ADMIN — Medication 3 ML: at 01:07

## 2017-07-12 RX ADMIN — IPRATROPIUM BROMIDE AND ALBUTEROL SULFATE 3 ML: .5; 3 SOLUTION RESPIRATORY (INHALATION) at 02:07

## 2017-07-12 RX ADMIN — ENOXAPARIN SODIUM 40 MG: 100 INJECTION SUBCUTANEOUS at 05:07

## 2017-07-12 RX ADMIN — ACETAMINOPHEN 1000 MG: 500 TABLET ORAL at 08:07

## 2017-07-12 RX ADMIN — GABAPENTIN 300 MG: 300 CAPSULE ORAL at 06:07

## 2017-07-12 RX ADMIN — GABAPENTIN 300 MG: 300 CAPSULE ORAL at 01:07

## 2017-07-12 RX ADMIN — FUROSEMIDE 40 MG: 40 TABLET ORAL at 05:07

## 2017-07-12 RX ADMIN — GABAPENTIN 300 MG: 300 CAPSULE ORAL at 09:07

## 2017-07-12 RX ADMIN — Medication 3 ML: at 09:07

## 2017-07-12 RX ADMIN — ENOXAPARIN SODIUM 40 MG: 100 INJECTION SUBCUTANEOUS at 06:07

## 2017-07-12 RX ADMIN — FUROSEMIDE 40 MG: 10 INJECTION, SOLUTION INTRAVENOUS at 06:07

## 2017-07-12 NOTE — ASSESSMENT & PLAN NOTE
7/12 - denies pain today.   OA right knee and right hip per xray  Tylenol  Opioids not indicated for chronic pain and contra-indcated with chronic resp failure and hypercapnea, unless end of life care desired.  Tylenol 1000 q 8 prn, , neurontin 300 tid

## 2017-07-12 NOTE — PLAN OF CARE
Problem: Patient Care Overview  Goal: Plan of Care Review  Outcome: Ongoing (interventions implemented as appropriate)  No acute events this shift. AVSS on RA. AOx4. Mother at bedside participating in care.     Problem: Fall Risk (Adult)  Intervention: Safety Precautions  On bedrest with bed alarm set & guard rails raised x3. Active ROM encouraged in bed to strenthen pt. Remains free of falls with call light & urinal at bedside.       Problem: Pressure Ulcer Risk (Baljinder Scale) (Adult,Obstetrics,Pediatric)  Intervention: Turn/Reposition Often  Weight shift discussed with pt, verbalizes understanding. Linens changed for moisture reasons. Wound care at bedside, powder applied to abdominal folds.       Problem: Cardiac: Heart Failure (Adult)  Intervention: Prevent/Manage DVT/VTE Risk  Urinal at bedside for strict I&Os - pt verbalizes understanding. Lasix transitioned to oral by MD. Legs elevated on pillows due to edema. 1 PRN breathing treatment requested by pt. Denies SOB.

## 2017-07-12 NOTE — SUBJECTIVE & OBJECTIVE
Interval History: happy    Review of Systems   Constitutional: Negative for appetite change and fatigue.        Pt talkatve and pleasant.   Massive obesity  . 650 #         HENT: Negative for congestion and trouble swallowing.    Respiratory: Negative for cough, shortness of breath and wheezing.    Cardiovascular: Negative for chest pain and leg swelling.   Gastrointestinal: Negative for abdominal pain, constipation, diarrhea, nausea and vomiting.   Genitourinary: Negative for difficulty urinating and dysuria.   Musculoskeletal: Negative for arthralgias, back pain and joint swelling.   Skin: Negative for rash and wound.   Neurological: Negative for dizziness, speech difficulty, light-headedness and headaches.   Psychiatric/Behavioral: Negative for agitation and behavioral problems.     Objective:     Vital Signs (Most Recent):  Temp: 98.7 °F (37.1 °C) (07/12/17 0727)  Pulse: 84 (07/12/17 0727)  Resp: 16 (07/12/17 0727)  BP: 126/61 (07/12/17 0727)  SpO2: 97 % (07/12/17 0727) Vital Signs (24h Range):  Temp:  [97.9 °F (36.6 °C)-98.8 °F (37.1 °C)] 98.7 °F (37.1 °C)  Pulse:  [] 84  Resp:  [16-20] 16  SpO2:  [82 %-99 %] 97 %  BP: (117-141)/(56-74) 126/61     Weight: (!) 294.8 kg (650 lb)  Body mass index is 85.76 kg/m².    Intake/Output Summary (Last 24 hours) at 07/12/17 1050  Last data filed at 07/12/17 0900   Gross per 24 hour   Intake             1000 ml   Output             4680 ml   Net            -3680 ml      Physical Exam   Constitutional: He is oriented to person, place, and time. He appears well-developed and well-nourished.   smiling   HENT:   Head: Normocephalic.   Mouth/Throat: Oropharynx is clear and moist.   Eyes: EOM are normal. Pupils are equal, round, and reactive to light. No scleral icterus.   Neck: Normal range of motion and full passive range of motion without pain. Neck supple. Carotid bruit is not present. No edema and normal range of motion present. No thyromegaly present.   Cardiovascular:  Normal rate, regular rhythm, normal heart sounds and intact distal pulses.    Pulmonary/Chest: Effort normal and breath sounds normal. No accessory muscle usage or stridor. No apnea. No respiratory distress. He has no wheezes. He has no rales. He exhibits no tenderness.   Quiet and difficult to hear   Abdominal: Soft. Bowel sounds are normal. He exhibits no distension, no ascites and no mass. There is no tenderness. There is no rebound and no guarding.   Musculoskeletal: Normal range of motion. He exhibits edema. He exhibits no tenderness.   No effusions in the knees   Lymphadenopathy:        Head (right side): No posterior auricular adenopathy present.   Neurological: He is alert and oriented to person, place, and time. He has normal strength. He displays normal reflexes.   Skin: Skin is warm and dry. No abrasion, no bruising, no ecchymosis, no laceration and no rash noted. No cyanosis or erythema. No pallor. Nails show no clubbing.   Psychiatric: He has a normal mood and affect. His behavior is normal. Judgment and thought content normal.       Significant Labs:   CBC: No results for input(s): WBC, HGB, HCT, PLT in the last 48 hours.  CMP:     Recent Labs  Lab 07/11/17  0420 07/11/17  1555 07/12/17  0437    140 141   K 4.8 4.4 4.0   CL 89* 88* 87*   CO2 38* 44* 49*   * 121* 111*   BUN 16 16 17   CREATININE 1.1 1.2 1.2   CALCIUM 9.7 9.5 9.3   ANIONGAP 18* 8 5*   EGFRNONAA >60.0 >60.0 >60.0

## 2017-07-12 NOTE — CONSULTS
Wound care consult received for assessment of skin folds.  With Nurse Randi's assistance and patient's verbal permission, assessed skin folds to note moisture to back right folds with very damp sheets under patient.  Slight redness to  sacrum/buttocks likely from moisture. Pt is on appropriate bariatric bed, mighty air model.  Changed bed sheets and place clean coviden pad under patient's back and buttocks.  Applied Interdry to moist folds and provided additional product to nurse.  Nurse verbalized understanding of proper placement of Interdry to moist skin folds.  Discussed with pt and his mother the proper skin care provided today.  Mother would like to use baby powder to buttocks.  Recommended antifungal powder instead of baby powder.  Pt verbalized understanding.

## 2017-07-12 NOTE — SUBJECTIVE & OBJECTIVE
Past Medical History:   Diagnosis Date    Anticoagulant long-term use     CHF (congestive heart failure)     Hypertension      Past Surgical History:   Procedure Laterality Date    ABDOMINAL SURGERY      FEMUR SURGERY       Review of patient's allergies indicates:   Allergen Reactions    Morphine        Scheduled Medications:    enoxparin  40 mg Subcutaneous Q12H    furosemide  40 mg Oral BID    gabapentin  300 mg Oral TID    sodium chloride 0.9%  3 mL Intravenous Q8H       PRN Medications: acetaminophen, albuterol-ipratropium 2.5mg-0.5mg/3mL, ondansetron    Family History     Problem Relation (Age of Onset)    Hypertension Mother        Social History Main Topics    Smoking status: Passive Smoke Exposure - Never Smoker    Smokeless tobacco: Never Used    Alcohol use No    Drug use:      Types: Cocaine      Comment: per OSH drug screen    Sexual activity: Not Currently     Review of Systems   Constitutional: Positive for activity change. Negative for chills, fatigue and fever.   HENT: Negative for trouble swallowing and voice change.    Eyes: Negative for photophobia and visual disturbance.   Respiratory: Negative for cough, shortness of breath and wheezing.    Cardiovascular: Negative for chest pain and palpitations.   Gastrointestinal: Negative for abdominal distention and nausea.   Genitourinary: Negative for difficulty urinating and flank pain.   Musculoskeletal: Positive for arthralgias, gait problem and neck pain.   Skin: Negative for color change and rash.   Neurological: Positive for weakness. Negative for speech difficulty, numbness and headaches.   Psychiatric/Behavioral: Negative for agitation and confusion.     Objective:     Vital Signs (Most Recent):  Temp: 99.1 °F (37.3 °C) (07/12/17 1126)  Pulse: 91 (07/12/17 1439)  Resp: 20 (07/12/17 1439)  BP: (!) 115/59 (07/12/17 1126)  SpO2: (!) 90 % (07/12/17 1439)    Vital Signs (24h Range):  Temp:  [97.9 °F (36.6 °C)-99.1 °F (37.3 °C)] 99.1 °F  (37.3 °C)  Pulse:  [] 91  Resp:  [16-20] 20  SpO2:  [82 %-99 %] 90 %  BP: (115-130)/(56-74) 115/59     Body mass index is 85.76 kg/m².    Physical Exam   Constitutional: He is oriented to person, place, and time. He appears well-developed and well-nourished.   HENT:   Head: Normocephalic and atraumatic.   Eyes: EOM are normal. Pupils are equal, round, and reactive to light.   Neck: Normal range of motion.   Cardiovascular: Normal rate and regular rhythm.    Pulmonary/Chest: No respiratory distress.   Receiving breathing tx upon exam   Abdominal: Soft. There is no tenderness.   Musculoskeletal: Normal range of motion. He exhibits edema (RLE & LLE) and tenderness (R hip and R knee (MJL tenderness)).   Moves all extremities    Neurological: He is alert and oriented to person, place, and time. He exhibits normal muscle tone.   Skin: Skin is warm and dry.   Psychiatric: He has a normal mood and affect. His behavior is normal.     NEUROLOGICAL EXAMINATION:     MENTAL STATUS   Oriented to person, place, and time.     CRANIAL NERVES     CN III, IV, VI   Pupils are equal, round, and reactive to light.  Extraocular motions are normal.       Diagnostic Results:   Echo: Reviewed   Labs: Reviewed  X-Ray: Reviewed

## 2017-07-12 NOTE — CONSULTS
[facility]  Physical Medicine & Rehab  Consult Note    Patient Name: Naeem Murray  MRN: [id number]  Admission Date: 7/9/2017  Hospital Length of Stay: 3 days  Attending Physician: Santa Ragsdale MD   Collaborating Physician : Pierce Dover MD    Consults  Subjective:     Principal Problem: Acute on chronic respiratory failure with hypercapnia    HPI: Naeem Murray is a 42-year-old male with PMHx of CHF (stopped taking home Lasix x 3 months), pulmonary hypertension (required home oxygen 4 liters and CPAP during sleep), cellulitis, hypertension and morbid obesity (now ~650lbs down from 780).  Patient presented to [facility] on 7/6 with worsening SOB and was found in hypercapnic respiratory failure.  He was transferred to [facility] on 7/9 for further evaluation and magagment. CXR on 7/9 difficult to interpret 2/2 body habitus.  He was started on BiPAP and diuresed with Lasix.  He became stable on 7/10 to step down to floor and was weaned to 02 NC on 4L.  He complained of R hip and R knee pain on 7/10, and xrays revealed OA of both joints.  Echo on 7/10 revealed R atrial and ventricular enlargement, moderate to severe tricuspid regurgitation, and pulmonary hypertension. He is currently tolerating his BiPAP well.       Functional History: Patient lives in [place] with his mother and brother in a trailor home with 4 steps to enter.  Prior to 3 months ago, he was independent with mobility, transfers, and ALDs. He had an acute decline in his functional status 3 months ago and became bed bound 2/2 bilateral lower extremity (R>L) pain and weakness after he did not refill his opoid pain medication.   DME: RW and WC.      Hospital Course:   7/11/17: Evaluated by PT. Bed mobility Jaycob-MaxA.  Sit to stand and transfers did not occur.  Sat EOB for 1 1/2 minutes.  Ambulated unable to attempt 2/2 dizziness while sitting for 2 mintues.      AM-PAC 6 CLICK MOBILITY (PT) - Total score: 9 (7/11)  AM-PAC  6 CLICK ADL (OT) - pending    AM-PAC Raw Score Level of Impairment Assistance   6 100% Total / Unable   7 - 8 80 - 100% Maximal Assist   9-13 60 - 80% Moderate Assist   14 - 19 40 - 60% Moderate Assist   20 - 22 20 - 40% Minimal Assist   23 1-20% SBA / CGA   24 0% Independent/ Mod I     Past Medical History:   Diagnosis Date    Anticoagulant long-term use     CHF (congestive heart failure)     Hypertension      Past Surgical History:   Procedure Laterality Date    ABDOMINAL SURGERY      FEMUR SURGERY       Review of patient's allergies indicates:   Allergen Reactions    Morphine        Scheduled Medications:    enoxparin  40 mg Subcutaneous Q12H    furosemide  40 mg Oral BID    gabapentin  300 mg Oral TID    sodium chloride 0.9%  3 mL Intravenous Q8H       PRN Medications: acetaminophen, albuterol-ipratropium 2.5mg-0.5mg/3mL, ondansetron    Family History     Problem Relation (Age of Onset)    Hypertension Mother        Social History Main Topics    Smoking status: Passive Smoke Exposure - Never Smoker    Smokeless tobacco: Never Used    Alcohol use No    Drug use:      Types: Cocaine      Comment: per OSH drug screen    Sexual activity: Not Currently     Review of Systems   Constitutional: Positive for activity change. Negative for chills, fatigue and fever.   HENT: Negative for trouble swallowing and voice change.    Eyes: Negative for photophobia and visual disturbance.   Respiratory: Negative for cough, shortness of breath and wheezing.    Cardiovascular: Negative for chest pain and palpitations.   Gastrointestinal: Negative for abdominal distention and nausea.   Genitourinary: Negative for difficulty urinating and flank pain.   Musculoskeletal: Positive for arthralgias, gait problem and neck pain.   Skin: Negative for color change and rash.   Neurological: Positive for weakness. Negative for speech difficulty, numbness and headaches.   Psychiatric/Behavioral: Negative for agitation and confusion.      Objective:     Vital Signs (Most Recent):  Temp: 99.1 °F (37.3 °C) (07/12/17 1126)  Pulse: 91 (07/12/17 1439)  Resp: 20 (07/12/17 1439)  BP: (!) 115/59 (07/12/17 1126)  SpO2: (!) 90 % (07/12/17 1439)    Vital Signs (24h Range):  Temp:  [97.9 °F (36.6 °C)-99.1 °F (37.3 °C)] 99.1 °F (37.3 °C)  Pulse:  [] 91  Resp:  [16-20] 20  SpO2:  [82 %-99 %] 90 %  BP: (115-130)/(56-74) 115/59     Body mass index is 85.76 kg/m².    Physical Exam   Constitutional: He is oriented to person, place, and time. He is morbidly obese.   HENT:   Head: Normocephalic and atraumatic.   Eyes: EOM are normal. Pupils are equal, round, and reactive to light.   Neck: Normal range of motion.   Cardiovascular: Normal rate and regular rhythm.    Pulmonary/Chest: No respiratory distress.   Receiving breathing tx upon exam   Abdominal: Soft. There is no tenderness.   Musculoskeletal: Normal range of motion. He exhibits edema (RLE & LLE) and tenderness (R hip and R knee (MJL tenderness)).   Moves all extremities    Neurological: He is alert and oriented to person, place, and time. He exhibits normal muscle tone.   Skin: Skin is warm and dry.   Psychiatric: He has a normal mood and affect. His behavior is normal.     NEUROLOGICAL EXAMINATION:     MENTAL STATUS   Oriented to person, place, and time.     CRANIAL NERVES     CN III, IV, VI   Pupils are equal, round, and reactive to light.  Extraocular motions are normal.       Diagnostic Results:   Echo: Reviewed   Labs: Reviewed  X-Ray: Reviewed    Assessment/Plan:     Leg pain    -OA right knee and right hip per xray on 7/10  -Tylenol prn per primary team   -continue PT/OT        Morbid obesity    - pt is considering bariatric surgery        * Acute on chronic respiratory failure with hypercapnia    -received BiPAP initially in [facility]  -now stable on 4L NC          OT notes pending. Patient participating with PT. Will follow patient's progress and discuss with rehab team for rehab  recommendations.      Thank you for your consult.     Sarai Antoine NP  Department of Physical Medicine & Rehab  [facility]

## 2017-07-12 NOTE — HOSPITAL COURSE
7/11/17: Evaluated by PT. Bed mobility Jaycob-MaxA.  Sit to stand and transfers did not occur.  Ambulated unable to attempt 2/2 dizziness while sitting for 2 mintues.      AM-PAC 6 CLICK MOBILITY (PT) - Total score: 9 (7/11)  AM-PAC 6 CLICK ADL (OT) - pending    AM-PAC Raw Score Level of Impairment Assistance   6 100% Total / Unable   7 - 8 80 - 100% Maximal Assist   9-13 60 - 80% Moderate Assist   14 - 19 40 - 60% Moderate Assist   20 - 22 20 - 40% Minimal Assist   23 1-20% SBA / CGA   24 0% Independent/ Mod I

## 2017-07-12 NOTE — PROGRESS NOTES
"[facility] Hospital Medicine  Progress Note    Patient Name: Naeem Murray  MRN: [id number]  Patient Class: IP- Inpatient   Admission Date: 7/9/2017  Length of Stay: 3 days  Attending Physician: Santa Ragsdale MD  Primary Care Provider: Provider [facility] Medicine Team: [facility] Santa Ragsdale MD    Subjective:     Principal Problem:Acute on chronic respiratory failure with hypercapnia    HPI:  42 year old male who presents as a transfer from [facility] where he had been admitted for hypercapneic respiratory failure on 7/6/16. He has a PMHx of CHF, pulmonary hypertension, cellulitis, hypertension and morbid obesity (now ~650lbs down from 780). On the evening of 7/5/17 Mr. Murray has worsening SOB which led him to be transported to [facility]. Until 3 months ago, Mr. Murray was mobilizing without assistance and able to climb a few stairs into his home but from March 2017 on he has become permanently bed bound due to leg pain. He was taking Percocet for leg pain, ran out and didn't return to the physician for any of his prescriptions to be refilled. He utilizes CPAP during sleep and requires home oxygen at 4L now, which he is complaint with. Per his mother Berenice he has been dealing with these respiratory issues for 11 years. She also endorses that he has ceased his at home medications since becoming bed bound.             Hospital Course:  Hospital/ICU Course:  7/9/2017 - patient on BiPAP of 20/10, satting well, CMP with Bicarb of 43  7/12 - sleeping well and tolerating bipap,  Discussed leg pain, xrays + OA, opiods not indicated, discuuse risk of death with hyppoercapnea and gave hime ' Safe Use and SE of Opioids"      Interval History: happy    Review of Systems   Constitutional: Negative for appetite change and fatigue.        Pt talkatve and pleasant.   Massive obesity  . 650 #         HENT: Negative for congestion and trouble swallowing.  "   Respiratory: Negative for cough, shortness of breath and wheezing.    Cardiovascular: Negative for chest pain and leg swelling.   Gastrointestinal: Negative for abdominal pain, constipation, diarrhea, nausea and vomiting.   Genitourinary: Negative for difficulty urinating and dysuria.   Musculoskeletal: Negative for arthralgias, back pain and joint swelling.   Skin: Negative for rash and wound.   Neurological: Negative for dizziness, speech difficulty, light-headedness and headaches.   Psychiatric/Behavioral: Negative for agitation and behavioral problems.     Objective:     Vital Signs (Most Recent):  Temp: 98.7 °F (37.1 °C) (07/12/17 0727)  Pulse: 84 (07/12/17 0727)  Resp: 16 (07/12/17 0727)  BP: 126/61 (07/12/17 0727)  SpO2: 97 % (07/12/17 0727) Vital Signs (24h Range):  Temp:  [97.9 °F (36.6 °C)-98.8 °F (37.1 °C)] 98.7 °F (37.1 °C)  Pulse:  [] 84  Resp:  [16-20] 16  SpO2:  [82 %-99 %] 97 %  BP: (117-141)/(56-74) 126/61     Weight: (!) 294.8 kg (650 lb)  Body mass index is 85.76 kg/m².    Intake/Output Summary (Last 24 hours) at 07/12/17 1050  Last data filed at 07/12/17 0900   Gross per 24 hour   Intake             1000 ml   Output             4680 ml   Net            -3680 ml      Physical Exam   Constitutional: He is oriented to person, place, and time. He appears well-developed and well-nourished.   smiling   HENT:   Head: Normocephalic.   Mouth/Throat: Oropharynx is clear and moist.   Eyes: EOM are normal. Pupils are equal, round, and reactive to light. No scleral icterus.   Neck: Normal range of motion and full passive range of motion without pain. Neck supple. Carotid bruit is not present. No edema and normal range of motion present. No thyromegaly present.   Cardiovascular: Normal rate, regular rhythm, normal heart sounds and intact distal pulses.    Pulmonary/Chest: Effort normal and breath sounds normal. No accessory muscle usage or stridor. No apnea. No respiratory distress. He has no wheezes.  "He has no rales. He exhibits no tenderness.   Quiet and difficult to hear   Abdominal: Soft. Bowel sounds are normal. He exhibits no distension, no ascites and no mass. There is no tenderness. There is no rebound and no guarding.   Musculoskeletal: Normal range of motion. He exhibits edema. He exhibits no tenderness.   No effusions in the knees   Lymphadenopathy:        Head (right side): No posterior auricular adenopathy present.   Neurological: He is alert and oriented to person, place, and time. He has normal strength. He displays normal reflexes.   Skin: Skin is warm and dry. No abrasion, no bruising, no ecchymosis, no laceration and no rash noted. No cyanosis or erythema. No pallor. Nails show no clubbing.   Psychiatric: He has a normal mood and affect. His behavior is normal. Judgment and thought content normal.       Significant Labs:   CBC: No results for input(s): WBC, HGB, HCT, PLT in the last 48 hours.  CMP:     Recent Labs  Lab 07/11/17  0420 07/11/17  1555 07/12/17  0437    140 141   K 4.8 4.4 4.0   CL 89* 88* 87*   CO2 38* 44* 49*   * 121* 111*   BUN 16 16 17   CREATININE 1.1 1.2 1.2   CALCIUM 9.7 9.5 9.3   ANIONGAP 18* 8 5*   EGFRNONAA >60.0 >60.0 >60.0           Assessment/Plan:      * Acute on chronic respiratory failure with hypercapnia    Stable on 4 liters  willl need home bipap 20/10  Hypercapnic - increased risk of death with opioids.  Gave hi " Safe use and SE of Opioids"             Leg pain    7/12 - denies pain today.   OA right knee and right hip per xray  Tylenol  Opioids not indicated for chronic pain and contra-indcated with chronic resp failure and hypercapnea, unless end of life care desired.  Tylenol 1000 q 8 prn, , neurontin 300 tid        Chronic respiratory acidosis    Hypoercapnia,   Required home oxygen 4 liters and bipap 20/10          Morbid obesity    7/12 - pt is considering bariatric surgery  With pulm HTM          Congestive heart failure    Stable  Dc IV " lasix, replace w lasix 40 po bid          Pulmonary hypertension    stable          Hypoalbuminemia    Alb 3.3          Discharge planning issues    DVT prophylaxis:  Lovenox bid  Consulted rehab            VTE Risk Mitigation         Ordered     enoxaparin injection 40 mg  Every 12 hours (non-standard times)     Route:  Subcutaneous        07/09/17 1539     High Risk of VTE  Once      07/09/17 1539          Santa Ragsdale MD  Department of Hospital Medicine   [facility]

## 2017-07-12 NOTE — ASSESSMENT & PLAN NOTE
"Stable on 4 liters  willl need home bipap 20/10  Hypercapnic - increased risk of death with opioids.  Gave hi " Safe use and SE of Opioids"       "

## 2017-07-12 NOTE — HPI
Naeem Murray is a 42-year-old male with PMHx of CHF (stopped taking home Lasix x 3 months), pulmonary hypertension (required home oxygen 4 liters and CPAP during sleep), cellulitis, hypertension and morbid obesity (now ~650lbs down from 780).  Patient presented to [facility] on 7/6 with worsening SOB and was found in hypercapnic respiratory failure.  He was transferred to [facility] on 7/9 for further evaluation and magagment. CXR on 7/9 difficult to interpret 2/2 body habitus.  He was started on BiPAP and diuresed with Lasix.  He became stable on 7/10 to step down to floor and was weaned to 02 NC on 4L.  He complained of R hip and R knee pain on 7/10, and xrays revealed OA of both joints.  Echo on 7/10 revealed R atrial and ventricular enlargement, moderate to severe tricuspid regurgitation, and pulmonary hypertension. He is currently tolerating his BiPAP well.       Functional History: Patient lives in [place] with his mother and brother in a trailor home with 4 steps to enter.  Prior to 3 months ago, he was independent with mobility, transfers, and ALDs. He had an acute decline in his functional status 3 months ago and became bed bound 2/2 bilateral lower extremity (R>L) pain and weakness after he ran out of his pain medication.   DME: RW and [facility].

## 2017-07-12 NOTE — PLAN OF CARE
Discharge planning: PT/OT recommending rehab facility. Pt would like to go to rehab. SW to explore options.

## 2017-07-12 NOTE — PLAN OF CARE
Problem: Patient Care Overview  Goal: Plan of Care Review  Outcome: Ongoing (interventions implemented as appropriate)  Plan of care reviewed with patient and mother. AVSS. AAOx4. bIPAP applied overnight. Critical CO2 of 49 this AM, MD notified, no new orders. Voiding per urinal, no BM during the shift. No complaints of pain. Bed extender applied to rosetta bed. No other changes. WCTM.

## 2017-07-12 NOTE — CONSULTS
PM&R consult received.    Reason for consult:  Debility     Reviewed patient history and current admission.  Rehab team following.  Full consult to follow.    BRIGID Wolfe, FNP-C  Physical Medicine & Rehabilitation   07/12/2017  Spectralink: [id number]

## 2017-07-13 LAB
ANION GAP SERPL CALC-SCNC: 5 MMOL/L
ANION GAP SERPL CALC-SCNC: 8 MMOL/L
BUN SERPL-MCNC: 17 MG/DL
BUN SERPL-MCNC: 18 MG/DL
CALCIUM SERPL-MCNC: 9.4 MG/DL
CALCIUM SERPL-MCNC: 9.5 MG/DL
CHLORIDE SERPL-SCNC: 88 MMOL/L
CHLORIDE SERPL-SCNC: 90 MMOL/L
CO2 SERPL-SCNC: 43 MMOL/L
CO2 SERPL-SCNC: 46 MMOL/L
CREAT SERPL-MCNC: 1.2 MG/DL
CREAT SERPL-MCNC: 1.2 MG/DL
EST. GFR  (AFRICAN AMERICAN): >60 ML/MIN/1.73 M^2
EST. GFR  (AFRICAN AMERICAN): >60 ML/MIN/1.73 M^2
EST. GFR  (NON AFRICAN AMERICAN): >60 ML/MIN/1.73 M^2
EST. GFR  (NON AFRICAN AMERICAN): >60 ML/MIN/1.73 M^2
GLUCOSE SERPL-MCNC: 119 MG/DL
GLUCOSE SERPL-MCNC: 124 MG/DL
MAGNESIUM SERPL-MCNC: 1.9 MG/DL
PHOSPHATE SERPL-MCNC: 3.7 MG/DL
POTASSIUM SERPL-SCNC: 4.1 MMOL/L
POTASSIUM SERPL-SCNC: 4.3 MMOL/L
SODIUM SERPL-SCNC: 139 MMOL/L
SODIUM SERPL-SCNC: 141 MMOL/L

## 2017-07-13 PROCEDURE — 63600175 PHARM REV CODE 636 W HCPCS: Performed by: STUDENT IN AN ORGANIZED HEALTH CARE EDUCATION/TRAINING PROGRAM

## 2017-07-13 PROCEDURE — 97110 THERAPEUTIC EXERCISES: CPT

## 2017-07-13 PROCEDURE — 80048 BASIC METABOLIC PNL TOTAL CA: CPT | Mod: 91

## 2017-07-13 PROCEDURE — 25000003 PHARM REV CODE 250: Performed by: STUDENT IN AN ORGANIZED HEALTH CARE EDUCATION/TRAINING PROGRAM

## 2017-07-13 PROCEDURE — 25000003 PHARM REV CODE 250: Performed by: HOSPITALIST

## 2017-07-13 PROCEDURE — 36415 COLL VENOUS BLD VENIPUNCTURE: CPT

## 2017-07-13 PROCEDURE — 99900035 HC TECH TIME PER 15 MIN (STAT)

## 2017-07-13 PROCEDURE — 20600001 HC STEP DOWN PRIVATE ROOM

## 2017-07-13 PROCEDURE — 97535 SELF CARE MNGMENT TRAINING: CPT

## 2017-07-13 PROCEDURE — 97530 THERAPEUTIC ACTIVITIES: CPT

## 2017-07-13 PROCEDURE — 84100 ASSAY OF PHOSPHORUS: CPT

## 2017-07-13 PROCEDURE — 99233 SBSQ HOSP IP/OBS HIGH 50: CPT | Mod: ,,, | Performed by: HOSPITALIST

## 2017-07-13 PROCEDURE — 99232 SBSQ HOSP IP/OBS MODERATE 35: CPT | Mod: ,,, | Performed by: NURSE PRACTITIONER

## 2017-07-13 PROCEDURE — 83735 ASSAY OF MAGNESIUM: CPT

## 2017-07-13 RX ADMIN — Medication 3 ML: at 10:07

## 2017-07-13 RX ADMIN — GABAPENTIN 300 MG: 300 CAPSULE ORAL at 09:07

## 2017-07-13 RX ADMIN — Medication 3 ML: at 02:07

## 2017-07-13 RX ADMIN — GABAPENTIN 300 MG: 300 CAPSULE ORAL at 02:07

## 2017-07-13 RX ADMIN — ENOXAPARIN SODIUM 40 MG: 100 INJECTION SUBCUTANEOUS at 06:07

## 2017-07-13 RX ADMIN — FUROSEMIDE 40 MG: 40 TABLET ORAL at 10:07

## 2017-07-13 RX ADMIN — GABAPENTIN 300 MG: 300 CAPSULE ORAL at 06:07

## 2017-07-13 RX ADMIN — ENOXAPARIN SODIUM 40 MG: 100 INJECTION SUBCUTANEOUS at 05:07

## 2017-07-13 RX ADMIN — FUROSEMIDE 40 MG: 40 TABLET ORAL at 05:07

## 2017-07-13 NOTE — PT/OT/SLP PROGRESS
Physical Therapy  Treatment    Naeem Murray   MRN: [id number]   Admitting Diagnosis: Acute on chronic respiratory failure with hypercapnia     PT Date: 07/13/2017  PT Start Time: 1045     PT Stop Time: 1131    PT Total Time (min): 46 min       Billable Minutes:  Therapeutic Activity 26 and Therapeutic Exercise 20    Treatment Type: Treatment  PT/PTA: PT     PTA Visit Number: 0       General Precautions: Standard, fall  Orthopedic Precautions: N/A   Braces: N/A    Do you have any cultural, spiritual, Sabianism conflicts, given your current situation?: none given    Subjective:  Communicated with RN prior to session.  Pt agreeable to therapy session.     Pain/Comfort  Pain Rating 1: 0/10  Pain Rating Post-Intervention 1: 0/10    Objective:   Patient found with: oxygen    Functional Mobility:  Bed Mobility:   Scooting/Bridging: Stand by Assistance (with elevated HOB)  Supine to Sit: Minimum Assistance, With side rail  Sit to Supine: Maximum Assistance, With siderail   -(pt required increased assist 2* bed broken in elevated HOB, pt unable to scoot while seated EOB to assist with bring B LE in bed)    Transfers:  Sit <> Stand Assistance:  (did not perform)    Balance:   Static Sit: GOOD: Takes MODERATE challenges from all directions  Dynamic Sit: GOOD: Maintains balance through MODERATE excursions of active trunk movement    Therapeutic Activities and Exercises:  Pt sat EOB for ~30min with SBA.  Pt performed seated scooting with SBA.   Pt performed seated there-ex B LE hip flex 10 reps x2, knee ext 10 reps x2.  Pt unable to perform sit<>stand 2* bed broken with HOB elevated.     AM-PAC 6 CLICK MOBILITY  How much help from another person does this patient currently need?   1 = Unable, Total/Dependent Assistance  2 = A lot, Maximum/Moderate Assistance  3 = A little, Minimum/Contact Guard/Supervision  4 = None, Modified [place]/Independent    Turning over in bed (including adjusting bedclothes, sheets and blankets)?:  3  Sitting down on and standing up from a chair with arms (e.g., wheelchair, bedside commode, etc.): 1  Moving from lying on back to sitting on the side of the bed?: 3  Moving to and from a bed to a chair (including a wheelchair)?: 1  Need to walk in hospital room?: 1  Climbing 3-5 steps with a railing?: 1  Total Score: 10    AM-PAC Raw Score CMS G-Code Modifier Level of Impairment Assistance   6 % Total / Unable   7 - 9 CM 80 - 100% Maximal Assist   10 - 14 CL 60 - 80% Moderate Assist   15 - 19 CK 40 - 60% Moderate Assist   20 - 22 CJ 20 - 40% Minimal Assist   23 CI 1-20% SBA / CGA   24 CH 0% Independent/ Mod I     Patient left supine with all lines intact, call button in reach, RN notified and family present.    Assessment:  Naeem Murray is a 42 y.o. male with a medical diagnosis of Acute on chronic respiratory failure with hypercapnia and presents with decreased strength, endurance, balance and overall functional mobility. Pt performed bed mobility min/max A and sat EOB with SBA. Pt will continue to benefit from skilled PT to improve deficits and increase overall functional mobility. Pt highly motivated and demonstrated increased ability to participate today. Pt reports mod (I) with amb and w/c transfers prior to B LE pain and motivated to return to PLOF. Pt will benefit from IP Rehab to gain more functional (I), return to PLOF and ensure safety upon d/c.     Rehab identified problem list/impairments: Rehab identified problem list/impairments: weakness, impaired endurance, impaired functional mobilty, gait instability, impaired balance, decreased coordination, decreased safety awareness, impaired skin    Rehab potential is good.    Activity tolerance: Good    Discharge recommendations: Discharge Facility/Level Of Care Needs: rehabilitation facility     Barriers to discharge: Barriers to Discharge: Inaccessible home environment, Decreased caregiver support (pt requires increased assist at this  time.)    Equipment recommendations: Equipment Needed After Discharge: other (see comments) (TBD)     GOALS:    Physical Therapy Goals        Problem: Physical Therapy Goal    Goal Priority Disciplines Outcome Goal Variances Interventions   Physical Therapy Goal     PT/OT, PT Ongoing (interventions implemented as appropriate)     Description:  Goals to be met by: 2017     Patient will increase functional independence with mobility by performin. Supine to sit with Contact Guard Assistance  2. Sit to supine with MInimal Assistance  3. Rolling to Left and Right with Modified [place].  4. Wheelchair propulsion x50 feet with Modified [place] using bilateral uppper extremities and KATHERINE LE  5. Sitting at edge of bed x20 minutes with Modified [place]  6. Lower extremity exercise program x20 reps per handout, with assistance as needed for increased strength and endurance to increase safety and independence with above mobility goals.  7. Sit to stand transfer with max A with appropriate AD.                       PLAN:    Patient to be seen 5 x/week  to address the above listed problems via gait training, therapeutic activities, therapeutic exercises, neuromuscular re-education, wheelchair management/training  Plan of Care expires: 17  Plan of Care reviewed with: patient, sibling, mother         MONISHA CARDENAS, PT  2017

## 2017-07-13 NOTE — ASSESSMENT & PLAN NOTE
7/13 - denies pain today.   OA right knee and right hip per xray  Tylenol  Opioids not indicated for chronic pain and contra-indcated with chronic resp failure and hypercapnea, unless end of life care desired.  Tylenol 1000 q 8 prn, , neurontin 300 tid

## 2017-07-13 NOTE — ASSESSMENT & PLAN NOTE
7/13 - pt is considering bariatric surgery, missed apt because he was in the hospital, needs to reschedule  With pulm HTM

## 2017-07-13 NOTE — PLAN OF CARE
Problem: Occupational Therapy Goal  Goal: Occupational Therapy Goal  Goals to be met by: 7-18-17     Patient will increase functional independence with ADLs by performing:    UE Dressing with Stand-by Assistance.  LE Dressing with Maximum Assistance.  Grooming while EOB with Supervision. -GOAL MET 07/13/17  Rolling to Bilateral with Stand-by Assistance.   Supine to sit with Stand-by Assistance.     Outcome: Ongoing (interventions implemented as appropriate)    Pt was agreeable to OT/PT and was noted to make progress towards his goals in therapy.  During today's session, pt was able to meet goal to complete grooming at EOB.  UE dressing not addressed during today's session due to adequate clothing not available (gown too small and pt did not have personal clothing available).  Pt's goals remain appropriate at this time.  He will continue to benefit from skilled OT services in order to assist him with increasing his safety and level of independence with self care and mobility tasks.     Marie Gutierrez, OT  7/13/2017

## 2017-07-13 NOTE — PT/OT/SLP PROGRESS
"Occupational Therapy  Treatment    Naeem Murray   MRN: [id number]   Admitting Diagnosis: Acute on chronic respiratory failure with hypercapnia    OT Date of Treatment: 07/13/17   OT Start Time: 1045  OT Stop Time: 1131  OT Total Time (min): 46 min    Billable Minutes:  Self care 20 min  Therapeutic Exercise 26 min  Co-treat with PT    General Precautions: Standard, fall  Orthopedic Precautions: N/A  Braces: N/A    Do you have any cultural, spiritual, Zoroastrianism conflicts, given your current situation?: none stated    Subjective:  Communicated with nurse prior to session.  "I'm going  Pain/Comfort  Pain Rating 1: 0/10    Objective:  Patient found with: oxygen     Functional Mobility:  Bed Mobility:  Scooting/Bridging: Stand by Assistance (pt able to scoot to EOB with supervision and increased time - pt scooted to HOB with bed in trendelenberg)  Supine to Sit: Minimum Assistance, Moderate Assistance, WIth side rail (pt able to lower legs and lift trunk 75% using bed rail and rest breaks - needed moderate physical assist to assist with lifting trunk remaining 25% due to weight)  Sit to Supine: Maximum Assistance, With assist of 2 (needed assistance of 2 people to lift B LEs - able to lower trunk using bed rail)  ** Difficulty with management noted during today's session - remote is unable to lower HOB making it more difficult for pt to perform bed mobility - work order submitted by nursing to fix problem.  Initially attempted supine to sit with bed mattress deflated but pt reported greater difficulty with transition.  Air inflated to firm setting and pt was able to perform majority of bed mobility transitions without assistance.     Transfers:   Sit <> Stand Assistance: Activity did not occur    Functional Ambulation: N/A    Activities of Daily Living:   Grooming Position: EOB  Grooming Level of Assistance: Supervision      Bathing Level of Assistance: Maximum assistance (assist needed to wash back and skin folds while " "seated on EOB)    Balance:   Static Sit: GOOD: Takes MODERATE challenges from all directions  Dynamic Sit: GOOD: Maintains balance through MODERATE excursions of active trunk movement    Therapeutic Activities and Exercises:  · Pt completed ADLs and func mobility activities for tx session as noted above  · Pt noted with functional bed mobility with increased time, increased rest breaks and use of arm rests to pull self.  Pt able to problem solve and instruct therapists on positioning of LEs to facilitate his mobility back into bed.    · OT able to discuss equipment and therapy needs with pt's mother as pt worked on bed mobility with PT.   · Pt completed UE exercises for 1 set of 20 reps of shoulder flex/ext, shoulder press, and elbow flex/ext.  Pt performed 1 set of 30 reps of forearm sup/pro.  Pt required rest breaks between sets - alternated UE/LE exercises to rest extremities.  Pt instructed to count outloud to ensure breathing during exercises as pt initially noted to hold breath during exercises.  Exercises performed EOB  · Whiteboard updated  · Pt educated on role of OT and POC      AM-PAC 6 CLICK ADL   How much help from another person does this patient currently need?   1 = Unable, Total/Dependent Assistance  2 = A lot, Maximum/Moderate Assistance  3 = A little, Minimum/Contact Guard/Supervision  4 = None, Modified [place]/Independent    Putting on and taking off regular lower body clothing? : 1  Bathing (including washing, rinsing, drying)?: 2  Toileting, which includes using toilet, bedpan, or urinal? : 2  Putting on and taking off regular upper body clothing?: 2  Taking care of personal grooming such as brushing teeth?: 4  Eating meals?: 4  Total Score: 15     AM-PAC Raw Score CMS "G-Code Modifier Level of Impairment Assistance   6 % Total / Unable   7 - 8 CM 80 - 100% Maximal Assist   9-13 CL 60 - 80% Moderate Assist   14 - 19 CK 40 - 60% Moderate Assist   20 - 22 CJ 20 - 40% Minimal Assist "   23 CI 1-20% SBA / CGA   24 CH 0% Independent/ Mod I       Patient left HOB elevated with all lines intact, call button in reach and mother present    ASSESSMENT:  Naeem Murray is a 42 y.o. male with a medical diagnosis of Acute on chronic respiratory failure with hypercapnia and presents with decreased strength/endurance, decreased self care, decreased bed mobility and gait instability. Pt was agreeable to OT/PT and was noted to make progress towards his goals in therapy.  During today's session, pt was able to meet goal to complete grooming at EOB.  UE dressing not addressed during today's session due to adequate clothing not available (gown too small and pt did not have personal clothing available).  Pt's goals remain appropriate at this time. He is gaining strength, endurance and bed mobility skills.  Standing not attempted as pt did not have bariatric walker available and bed was not functioning properly.  He will continue to benefit from skilled OT services in order to assist him with increasing his safety and level of independence with self care and mobility tasks.   OT continues to recommend in patient rehab for pt's post acute therapy needs.  Pt is tolerating therapy well and is willing to push himself further with therapy, utilizing rest breaks for greater duration.  Pt states he did well with therapy in the past and was walking at home with assistance until he began having problems with his R leg.      .    Rehab identified problem list/impairments: Rehab identified problem list/impairments: weakness, impaired endurance, impaired self care skills, impaired functional mobilty, gait instability, impaired balance, decreased safety awareness, decreased lower extremity function, decreased upper extremity function, decreased ROM, impaired coordination, impaired skin    Rehab potential is good.    Activity tolerance: Good    Discharge recommendations: Discharge Facility/Level Of Care Needs: rehabilitation  facility     Barriers to discharge: Barriers to Discharge: Inaccessible home environment    Equipment recommendations:  (TBD)     GOALS:    Occupational Therapy Goals        Problem: Occupational Therapy Goal    Goal Priority Disciplines Outcome Interventions   Occupational Therapy Goal     OT, PT/OT Ongoing (interventions implemented as appropriate)    Description:  Goals to be met by: 7-18-17     Patient will increase functional independence with ADLs by performing:    UE Dressing with Stand-by Assistance.  LE Dressing with Maximum Assistance.  Grooming while EOB with Supervision. -GOAL MET 07/13/17  Rolling to Bilateral with Stand-by Assistance.   Supine to sit with Stand-by Assistance.                       Plan:  Patient to be seen 5 x/week to address the above listed problems via self-care/home management, therapeutic activities, therapeutic exercises  Plan of Care expires: 08/10/17  Plan of Care reviewed with: patient, mother         Marie DANIELS Matt, OT  07/13/2017

## 2017-07-13 NOTE — PLAN OF CARE
Problem: Physical Therapy Goal  Goal: Physical Therapy Goal  Goals to be met by: 2017     Patient will increase functional independence with mobility by performin. Supine to sit with Contact Guard Assistance  2. Sit to supine with MInimal Assistance  3. Rolling to Left and Right with Modified [place].  4. Wheelchair propulsion x50 feet with Modified [place] using bilateral uppper extremities and KATHERINE LE  5. Sitting at edge of bed x20 minutes with Modified [place]  6. Lower extremity exercise program x20 reps per handout, with assistance as needed for increased strength and endurance to increase safety and independence with above mobility goals.  7. Sit to stand transfer with max A with appropriate AD.     Outcome: Ongoing (interventions implemented as appropriate)  Pt progressing towards goals.

## 2017-07-13 NOTE — ASSESSMENT & PLAN NOTE
Hypoercapnia with chronic metabolic alkalosis (CO2 40s)  Required home oxygen 4 liters and bipap 20/10

## 2017-07-13 NOTE — SUBJECTIVE & OBJECTIVE
Past Medical History:   Diagnosis Date    Anticoagulant long-term use     CHF (congestive heart failure)     Hypertension      Past Surgical History:   Procedure Laterality Date    ABDOMINAL SURGERY      FEMUR SURGERY       Review of patient's allergies indicates:   Allergen Reactions    Morphine        Scheduled Medications:    enoxparin  40 mg Subcutaneous Q12H    furosemide  40 mg Oral BID    gabapentin  300 mg Oral TID    sodium chloride 0.9%  3 mL Intravenous Q8H       PRN Medications: acetaminophen, albuterol-ipratropium 2.5mg-0.5mg/3mL, ondansetron    Family History     Problem Relation (Age of Onset)    Hypertension Mother        Social History Main Topics    Smoking status: Passive Smoke Exposure - Never Smoker    Smokeless tobacco: Never Used    Alcohol use No    Drug use:      Types: Cocaine      Comment: per OSH drug screen    Sexual activity: Not Currently     Review of Systems   Constitutional: Positive for activity change. Negative for chills, fatigue and fever.   HENT: Negative for trouble swallowing and voice change.    Eyes: Negative for photophobia and visual disturbance.   Respiratory: Negative for cough, shortness of breath and wheezing.    Cardiovascular: Negative for chest pain and palpitations.   Gastrointestinal: Negative for abdominal distention and nausea.   Genitourinary: Negative for difficulty urinating and flank pain.   Musculoskeletal: Positive for arthralgias, gait problem and neck pain.   Skin: Negative for color change and rash.   Neurological: Positive for weakness. Negative for speech difficulty, numbness and headaches.   Psychiatric/Behavioral: Negative for agitation and confusion.     Objective:     Vital Signs (Most Recent):  Temp: 97.7 °F (36.5 °C) (07/13/17 0835)  Pulse: 93 (07/13/17 0835)  Resp: 20 (07/13/17 0835)  BP: 137/83 (07/13/17 0835)  SpO2: 97 % (07/13/17 0835)    Vital Signs (24h Range):  Temp:  [97.7 °F (36.5 °C)-98.7 °F (37.1 °C)] 97.7 °F (36.5  °C)  Pulse:  [77-97] 93  Resp:  [18-24] 20  SpO2:  [90 %-97 %] 97 %  BP: (110-140)/(60-83) 137/83     Body mass index is 85.76 kg/m².    Physical Exam   Constitutional: He is oriented to person, place, and time. He appears well-developed and well-nourished.   Sitting EOB working with therapy upon entering room   HENT:   Head: Normocephalic and atraumatic.   Eyes: EOM are normal. Pupils are equal, round, and reactive to light.   Neck: Normal range of motion.   Cardiovascular: Normal rate and regular rhythm.    Pulmonary/Chest: No respiratory distress.   Receiving breathing tx upon exam   Abdominal: Soft. There is no tenderness.   Musculoskeletal: Normal range of motion. He exhibits edema (RLE & LLE) and tenderness (R hip and R knee (MJL tenderness)).   Moves all extremities    Neurological: He is alert and oriented to person, place, and time. He exhibits normal muscle tone.   Skin: Skin is warm and dry.   Psychiatric: He has a normal mood and affect. His behavior is normal.     NEUROLOGICAL EXAMINATION:     MENTAL STATUS   Oriented to person, place, and time.     CRANIAL NERVES     CN III, IV, VI   Pupils are equal, round, and reactive to light.  Extraocular motions are normal.       Diagnostic Results:   Echo: Reviewed   Labs: Reviewed  X-Ray: Reviewed

## 2017-07-13 NOTE — PROGRESS NOTES
"[facility] Hospital Medicine  Progress Note    Patient Name: Naeem Murray  MRN: [id number]  Patient Class: IP- Inpatient   Admission Date: 7/9/2017  Length of Stay: 4 days  Attending Physician: Santa Ragsdale MD  Primary Care Provider: Provider [facility] Medicine Team: [facility] B Santa Ragsdale MD    Subjective:     Principal Problem:Acute on chronic respiratory failure with hypercapnia    HPI:  42 year old male who presents as a transfer from [facility] where he had been admitted for hypercapneic respiratory failure on 7/6/16. He has a PMHx of CHF, pulmonary hypertension, cellulitis, hypertension and morbid obesity (now ~650lbs down from 780). On the evening of 7/5/17 Mr. Murray has worsening SOB which led him to be transported to [facility]. Until 3 months ago, Mr. Murray was mobilizing without assistance and able to climb a few stairs into his home but from March 2017 on he has become permanently bed bound due to leg pain. He was taking Percocet for leg pain, ran out and didn't return to the physician for any of his prescriptions to be refilled. He utilizes CPAP during sleep and requires home oxygen at 4L now, which he is complaint with. Per his mother Berenice he has been dealing with these respiratory issues for 11 years. She also endorses that he has ceased his at home medications since becoming bed bound.             Hospital Course:  Hospital/ICU Course:  7/9/2017 - patient on BiPAP of 20/10, satting well, CMP with Bicarb of 43  7/12 - sleeping well and tolerating bipap,  Discussed leg pain, xrays + OA, opiods not indicated, discuuse risk of death with hyppoercapnea and gave hime ' Safe Use and SE of Opioids"    7/13 - pt happy, mother in room, spoke with them and w sister over facetime.  Discussed his medical problems: pulmonary HTN, CHF,  bariatric surgery, and OA of the hip and knee.  Pt ready for rehab unit.  Pt not in pain.     Interval " History: happy, slept well    Review of Systems   Constitutional: Negative for appetite change and fatigue.        Pt talkatve and pleasant.   Massive obesity  . 650 #         HENT: Negative for congestion and trouble swallowing.    Respiratory: Negative for cough, shortness of breath and wheezing.    Cardiovascular: Negative for chest pain and leg swelling.   Gastrointestinal: Negative for abdominal pain, constipation, diarrhea, nausea and vomiting.   Genitourinary: Negative for difficulty urinating and dysuria.   Musculoskeletal: Negative for arthralgias, back pain and joint swelling.   Skin: Negative for rash and wound.   Neurological: Negative for dizziness, speech difficulty, light-headedness and headaches.   Psychiatric/Behavioral: Negative for agitation and behavioral problems.     Objective:     Vital Signs (Most Recent):  Temp: 98.2 °F (36.8 °C) (07/13/17 1246)  Pulse: 90 (07/13/17 1246)  Resp: 19 (07/13/17 1246)  BP: 123/70 (07/13/17 1246)  SpO2: (!) 94 % (07/13/17 1246) Vital Signs (24h Range):  Temp:  [97.7 °F (36.5 °C)-98.7 °F (37.1 °C)] 98.2 °F (36.8 °C)  Pulse:  [77-97] 90  Resp:  [18-24] 19  SpO2:  [92 %-97 %] 94 %  BP: (110-140)/(60-83) 123/70     Weight: (!) 294.8 kg (650 lb)  Body mass index is 85.76 kg/m².    Intake/Output Summary (Last 24 hours) at 07/13/17 1459  Last data filed at 07/13/17 1400   Gross per 24 hour   Intake              400 ml   Output             1950 ml   Net            -1550 ml      Physical Exam   Constitutional: He is oriented to person, place, and time. He appears well-developed and well-nourished.   smiling   HENT:   Head: Normocephalic.   Mouth/Throat: Oropharynx is clear and moist.   Eyes: EOM are normal. Pupils are equal, round, and reactive to light. No scleral icterus.   Neck: Normal range of motion and full passive range of motion without pain. Neck supple. Carotid bruit is not present. No edema and normal range of motion present. No thyromegaly present.  "  Cardiovascular: Normal rate, regular rhythm, normal heart sounds and intact distal pulses.    Pulmonary/Chest: Effort normal and breath sounds normal. No accessory muscle usage or stridor. No apnea. No respiratory distress. He has no wheezes. He has no rales. He exhibits no tenderness.   Quiet and difficult to hear   Abdominal: Soft. Bowel sounds are normal. He exhibits no distension, no ascites and no mass. There is no tenderness. There is no rebound and no guarding.   Musculoskeletal: Normal range of motion. He exhibits edema. He exhibits no tenderness.   No effusions in the knees   Lymphadenopathy:        Head (right side): No posterior auricular adenopathy present.   Neurological: He is alert and oriented to person, place, and time. He has normal strength. He displays normal reflexes.   Skin: Skin is warm and dry. No abrasion, no bruising, no ecchymosis, no laceration and no rash noted. No cyanosis or erythema. No pallor. Nails show no clubbing.   Psychiatric: He has a normal mood and affect. His behavior is normal. Judgment and thought content normal.       Significant Labs:   CBC: No results for input(s): WBC, HGB, HCT, PLT in the last 48 hours.  CMP:     Recent Labs  Lab 07/12/17  0437 07/12/17  1635 07/13/17  0405    141 141   K 4.0 4.3 4.1   CL 87* 88* 90*   CO2 49* 44* 46*   * 129* 124*   BUN 17 18 18   CREATININE 1.2 1.3 1.2   CALCIUM 9.3 9.4 9.4   ANIONGAP 5* 9 5*   EGFRNONAA >60.0 >60.0 >60.0           Assessment/Plan:      * Acute on chronic respiratory failure with hypercapnia    7/13 Stable on 4 liters  willl need home bipap 20/10  Hypercapnic - increased risk of death with opioids.  Gave hi " Safe use and SE of Opioids"             Leg pain    7/13 - denies pain today.   OA right knee and right hip per xray  Tylenol  Opioids not indicated for chronic pain and contra-indcated with chronic resp failure and hypercapnea, unless end of life care desired.  Tylenol 1000 q 8 prn, , neurontin " 300 tid        Chronic respiratory acidosis    Hypoercapnia with chronic metabolic alkalosis (CO2 40s)  Required home oxygen 4 liters and bipap 20/10          Morbid obesity    7/13 - pt is considering bariatric surgery, missed apt because he was in the hospital, needs to reschedule  With pulm HTM          Congestive heart failure    Stable  Dc IV lasix, replace w lasix 40 po bid          Pulmonary hypertension    stable          Hypoalbuminemia    Alb 3.3          Discharge planning issues    DVT prophylaxis:  Lovenox bid  Consulted rehab              VTE Risk Mitigation         Ordered     enoxaparin injection 40 mg  Every 12 hours (non-standard times)     Route:  Subcutaneous        07/09/17 1539     High Risk of VTE  Once      07/09/17 1539          Santa Ragsdale MD  Department of Hospital Medicine   [facility]

## 2017-07-13 NOTE — PHYSICIAN QUERY
"PT Name: Naeem Murray  MR #: [id number]    Physician Query Form - Heart  Condition Clarification     CDS/: Rubia Bejarano RN        Contact information:[email or web address]  This form is a permanent document in the medical record.     Query Date: July 13, 2017    By submitting this query, we are merely seeking further clarification of documentation. Please utilize your independent clinical judgment when addressing the question(s) below.    The medical record contains the following   Indicators     Supporting Clinical Findings Location in Medical Record   X BNP BNP 84 Labs 7/9   X EF Normal left ventricular systolic function (EF 60-65%).  2D Echo 7/10   X Radiology findings Cardiac silhouette is enlarged.   CXR 7/9   X Echo Results 1 - Normal left ventricular systolic function (EF 60-65%).   2 - Normal left ventricular diastolic function.   3 - No wall motion abnormalities.   4 - Mildly to moderately enlarged ascending aorta.   5 - Right atrial enlargement.   6 - Right ventricular enlargement with not well seen systolic function.   7 - Moderate to severe tricuspid regurgitation.   8 - Pulmonary hypertension. The estimated PA systolic pressure is greater than 69 2D Echo 7/10    "Ascites" documented     X "SOB" or "CROCKETT" documented Positive for shortness of breath H & P    "Hypoxia" documented     X Heart Failure documented Congestive Heart Failure  H & P   X "Edema" documented He exhibits edema (RLE & LLE) and tenderness (R hip and R knee (MJL tenderness) Progress note 7/13/2017   X Diuretics/Meds Furosemide injection 40 mg  Dose: 40 mg Freq: 3 times daily Route: IV MAR    X Treatment: albuterol-ipratropium 2.5mg-0.5mg/3mL nebulizer solution 3 mL  Dose: 3 mL Freq: Every 4 hours PRN Route: NEBULIZATION MAR    Other:          Provider, please specify diagnosis or diagnoses associated with above clinical findings.                               [  ] Chronic Systolic Heart Failure (EF < 40)*  [  ] Chronic " Diastolic Heart Failure (EF > 40)*  [  ] Acute on Chronic Combined Systolic and Diastolic Heart Failure  [  ] Chronic Combined Systolic and Diastolic Heart Failure  [  ] Other Type of Heart Failure (please specify type): _________________________  [  ] Heart Failure Ruled Out  [  x] Other (please specify): _____right HF due to plmnary HTN, obesity______________________________  [  ] Clinically Undetermined            *American Heart Association                                                                                                          Please document in your progress notes daily for the duration of treatment until resolved and include in your discharge summary.

## 2017-07-13 NOTE — SUBJECTIVE & OBJECTIVE
Interval History: happy, slept well    Review of Systems   Constitutional: Negative for appetite change and fatigue.        Pt talkatve and pleasant.   Massive obesity  . 650 #         HENT: Negative for congestion and trouble swallowing.    Respiratory: Negative for cough, shortness of breath and wheezing.    Cardiovascular: Negative for chest pain and leg swelling.   Gastrointestinal: Negative for abdominal pain, constipation, diarrhea, nausea and vomiting.   Genitourinary: Negative for difficulty urinating and dysuria.   Musculoskeletal: Negative for arthralgias, back pain and joint swelling.   Skin: Negative for rash and wound.   Neurological: Negative for dizziness, speech difficulty, light-headedness and headaches.   Psychiatric/Behavioral: Negative for agitation and behavioral problems.     Objective:     Vital Signs (Most Recent):  Temp: 98.2 °F (36.8 °C) (07/13/17 1246)  Pulse: 90 (07/13/17 1246)  Resp: 19 (07/13/17 1246)  BP: 123/70 (07/13/17 1246)  SpO2: (!) 94 % (07/13/17 1246) Vital Signs (24h Range):  Temp:  [97.7 °F (36.5 °C)-98.7 °F (37.1 °C)] 98.2 °F (36.8 °C)  Pulse:  [77-97] 90  Resp:  [18-24] 19  SpO2:  [92 %-97 %] 94 %  BP: (110-140)/(60-83) 123/70     Weight: (!) 294.8 kg (650 lb)  Body mass index is 85.76 kg/m².    Intake/Output Summary (Last 24 hours) at 07/13/17 1459  Last data filed at 07/13/17 1400   Gross per 24 hour   Intake              400 ml   Output             1950 ml   Net            -1550 ml      Physical Exam   Constitutional: He is oriented to person, place, and time. He appears well-developed and well-nourished.   smiling   HENT:   Head: Normocephalic.   Mouth/Throat: Oropharynx is clear and moist.   Eyes: EOM are normal. Pupils are equal, round, and reactive to light. No scleral icterus.   Neck: Normal range of motion and full passive range of motion without pain. Neck supple. Carotid bruit is not present. No edema and normal range of motion present. No thyromegaly present.    Cardiovascular: Normal rate, regular rhythm, normal heart sounds and intact distal pulses.    Pulmonary/Chest: Effort normal and breath sounds normal. No accessory muscle usage or stridor. No apnea. No respiratory distress. He has no wheezes. He has no rales. He exhibits no tenderness.   Quiet and difficult to hear   Abdominal: Soft. Bowel sounds are normal. He exhibits no distension, no ascites and no mass. There is no tenderness. There is no rebound and no guarding.   Musculoskeletal: Normal range of motion. He exhibits edema. He exhibits no tenderness.   No effusions in the knees   Lymphadenopathy:        Head (right side): No posterior auricular adenopathy present.   Neurological: He is alert and oriented to person, place, and time. He has normal strength. He displays normal reflexes.   Skin: Skin is warm and dry. No abrasion, no bruising, no ecchymosis, no laceration and no rash noted. No cyanosis or erythema. No pallor. Nails show no clubbing.   Psychiatric: He has a normal mood and affect. His behavior is normal. Judgment and thought content normal.       Significant Labs:   CBC: No results for input(s): WBC, HGB, HCT, PLT in the last 48 hours.  CMP:     Recent Labs  Lab 07/12/17  0437 07/12/17  1635 07/13/17  0405    141 141   K 4.0 4.3 4.1   CL 87* 88* 90*   CO2 49* 44* 46*   * 129* 124*   BUN 17 18 18   CREATININE 1.2 1.3 1.2   CALCIUM 9.3 9.4 9.4   ANIONGAP 5* 9 5*   EGFRNONAA >60.0 >60.0 >60.0

## 2017-07-13 NOTE — ASSESSMENT & PLAN NOTE
"7/13 Stable on 4 liters  willl need home bipap 20/10  Hypercapnic - increased risk of death with opioids.  Gave hi " Safe use and SE of Opioids"       "

## 2017-07-13 NOTE — PROGRESS NOTES
[facility]  Physical Medicine & Rehab  Progress Note    Patient Name: Naeem Murray  MRN: [id number]  Admission Date: 7/9/2017  Length of Stay: 4 days  Attending Physician: Santa Ragsdale MD  Collaborating Physician : Pierce Dover MD    Subjective:     Principal Problem:Acute on chronic respiratory failure with hypercapnia     Interval History (07/13/2017):  Patient is seen for follow-up rehab evaluation and recommendations.  No acute events over night. Participating with therapy.  Barriers for discharge/rehab admission: poor endurance for IPR     HPI, Past Medical, Surgical, Family, and Social History remains the same as documented in the initial encounter.    Hospital Course:   7/11/17: Evaluated by PT. Bed mobility Jaycob-MaxA.  Sit to stand and transfers did not occur. EOB for less 1.5 minutes 2/2 dizziness.   Ambulated unable to attempt 2/2 dizziness. UBD ModA and LBD TotalA.   7/13/17: Participated with therapy. Bed mobility Adore-CGA.  Sit to stand MaxA with appropriate AD.  Sat EOB x20 minutes with Adore. Wheelchair propulsion 50 ft with Adore using BUE. Grooming: SV.     AM-PAC 6 CLICK MOBILITY (PT) - Total score: 9 (7/11)  AM-PAC 6 CLICK ADL (OT) -  13 (7/11), 15 (7/13)     AM-PAC Raw Score Level of Impairment Assistance   6 100% Total / Unable   7 - 8 80 - 100% Maximal Assist   9-13 60 - 80% Moderate Assist   14 - 19 40 - 60% Moderate Assist   20 - 22 20 - 40% Minimal Assist   23 1-20% SBA / CGA   24 0% Independent/ Mod I     Past Medical History:   Diagnosis Date    Anticoagulant long-term use     CHF (congestive heart failure)     Hypertension      Past Surgical History:   Procedure Laterality Date    ABDOMINAL SURGERY      FEMUR SURGERY       Review of patient's allergies indicates:   Allergen Reactions    Morphine        Scheduled Medications:    enoxparin  40 mg Subcutaneous Q12H    furosemide  40 mg Oral BID    gabapentin  300 mg Oral TID    sodium chloride 0.9%  3 mL  Intravenous Q8H       PRN Medications: acetaminophen, albuterol-ipratropium 2.5mg-0.5mg/3mL, ondansetron    Family History     Problem Relation (Age of Onset)    Hypertension Mother        Social History Main Topics    Smoking status: Passive Smoke Exposure - Never Smoker    Smokeless tobacco: Never Used    Alcohol use No    Drug use:      Types: Cocaine      Comment: per OSH drug screen    Sexual activity: Not Currently     Review of Systems   Constitutional: Positive for activity change. Negative for chills, fatigue and fever.   HENT: Negative for trouble swallowing and voice change.    Eyes: Negative for photophobia and visual disturbance.   Respiratory: Negative for cough, shortness of breath and wheezing.    Cardiovascular: Negative for chest pain and palpitations.   Gastrointestinal: Negative for abdominal distention and nausea.   Genitourinary: Negative for difficulty urinating and flank pain.   Musculoskeletal: Positive for arthralgias, gait problem and neck pain.   Skin: Negative for color change and rash.   Neurological: Positive for weakness. Negative for speech difficulty, numbness and headaches.   Psychiatric/Behavioral: Negative for agitation and confusion.     Objective:     Vital Signs (Most Recent):  Temp: 97.7 °F (36.5 °C) (07/13/17 0835)  Pulse: 93 (07/13/17 0835)  Resp: 20 (07/13/17 0835)  BP: 137/83 (07/13/17 0835)  SpO2: 97 % (07/13/17 0835)    Vital Signs (24h Range):  Temp:  [97.7 °F (36.5 °C)-98.7 °F (37.1 °C)] 97.7 °F (36.5 °C)  Pulse:  [77-97] 93  Resp:  [18-24] 20  SpO2:  [90 %-97 %] 97 %  BP: (110-140)/(60-83) 137/83     Body mass index is 85.76 kg/m².    Physical Exam   Constitutional: He is oriented to person, place, and time. He appears well-developed and well-nourished.   Sitting EOB working with therapy upon entering room   HENT:   Head: Normocephalic and atraumatic.   Eyes: EOM are normal. Pupils are equal, round, and reactive to light.   Neck: Normal range of motion.    Cardiovascular: Normal rate and regular rhythm.    Pulmonary/Chest: No respiratory distress.   Receiving breathing tx upon exam   Abdominal: Soft. There is no tenderness.   Musculoskeletal: Normal range of motion. He exhibits edema (RLE & LLE) and tenderness (R hip and R knee (MJL tenderness)).   Moves all extremities    Neurological: He is alert and oriented to person, place, and time. He exhibits normal muscle tone.   Skin: Skin is warm and dry.   Psychiatric: He has a normal mood and affect. His behavior is normal.     NEUROLOGICAL EXAMINATION:     MENTAL STATUS   Oriented to person, place, and time.     CRANIAL NERVES     CN III, IV, VI   Pupils are equal, round, and reactive to light.  Extraocular motions are normal.       Diagnostic Results:   Echo: Reviewed   Labs: Reviewed  X-Ray: Reviewed    Assessment/Plan:      Leg pain    -OA right knee and right hip per xray on 7/10  -Tylenol prn per primary team   -continue PT/OT        Congestive heart failure    -home Lasix (stopped taking home medication about 3 months ago)        Pulmonary hypertension    -as seen on echo on 7/10        Morbid obesity    -BMI 85.7  - pt is considering bariatric surgery        * Acute on chronic respiratory failure with hypercapnia    -received BiPAP initially in [facility]  -now stable on 4L NC and receiving breathing trxs          Patient would likely not benefit from IPR due to poor endurance from being bed bound for 3 months at this current time. Recommend SNF or NH.  Will sign off.  Please call with questions/concerns or re-consult if situation changes.      Thank you for your consult.     Sarai Antoine NP  Department of Physical Medicine & Rehab   [facility]

## 2017-07-14 VITALS
DIASTOLIC BLOOD PRESSURE: 75 MMHG | OXYGEN SATURATION: 96 % | HEIGHT: 73 IN | SYSTOLIC BLOOD PRESSURE: 126 MMHG | WEIGHT: 315 LBS | BODY MASS INDEX: 41.75 KG/M2 | TEMPERATURE: 99 F | RESPIRATION RATE: 19 BRPM | HEART RATE: 85 BPM

## 2017-07-14 LAB
ANION GAP SERPL CALC-SCNC: 6 MMOL/L
ANION GAP SERPL CALC-SCNC: 7 MMOL/L
BUN SERPL-MCNC: 16 MG/DL
BUN SERPL-MCNC: 16 MG/DL
CALCIUM SERPL-MCNC: 9.2 MG/DL
CALCIUM SERPL-MCNC: 9.3 MG/DL
CHLORIDE SERPL-SCNC: 90 MMOL/L
CHLORIDE SERPL-SCNC: 90 MMOL/L
CO2 SERPL-SCNC: 41 MMOL/L
CO2 SERPL-SCNC: 43 MMOL/L
CREAT SERPL-MCNC: 1.1 MG/DL
CREAT SERPL-MCNC: 1.1 MG/DL
EST. GFR  (AFRICAN AMERICAN): >60 ML/MIN/1.73 M^2
EST. GFR  (AFRICAN AMERICAN): >60 ML/MIN/1.73 M^2
EST. GFR  (NON AFRICAN AMERICAN): >60 ML/MIN/1.73 M^2
EST. GFR  (NON AFRICAN AMERICAN): >60 ML/MIN/1.73 M^2
GLUCOSE SERPL-MCNC: 104 MG/DL
GLUCOSE SERPL-MCNC: 115 MG/DL
MAGNESIUM SERPL-MCNC: 1.8 MG/DL
PHOSPHATE SERPL-MCNC: 3.1 MG/DL
POTASSIUM SERPL-SCNC: 4.3 MMOL/L
POTASSIUM SERPL-SCNC: 4.4 MMOL/L
SODIUM SERPL-SCNC: 137 MMOL/L
SODIUM SERPL-SCNC: 140 MMOL/L

## 2017-07-14 PROCEDURE — 83735 ASSAY OF MAGNESIUM: CPT

## 2017-07-14 PROCEDURE — 99239 HOSP IP/OBS DSCHRG MGMT >30: CPT | Mod: ,,, | Performed by: HOSPITALIST

## 2017-07-14 PROCEDURE — 36415 COLL VENOUS BLD VENIPUNCTURE: CPT

## 2017-07-14 PROCEDURE — 25000003 PHARM REV CODE 250: Performed by: STUDENT IN AN ORGANIZED HEALTH CARE EDUCATION/TRAINING PROGRAM

## 2017-07-14 PROCEDURE — 25000003 PHARM REV CODE 250: Performed by: HOSPITALIST

## 2017-07-14 PROCEDURE — 99900035 HC TECH TIME PER 15 MIN (STAT)

## 2017-07-14 PROCEDURE — 80048 BASIC METABOLIC PNL TOTAL CA: CPT

## 2017-07-14 PROCEDURE — 63600175 PHARM REV CODE 636 W HCPCS: Performed by: STUDENT IN AN ORGANIZED HEALTH CARE EDUCATION/TRAINING PROGRAM

## 2017-07-14 PROCEDURE — 84100 ASSAY OF PHOSPHORUS: CPT

## 2017-07-14 PROCEDURE — 94660 CPAP INITIATION&MGMT: CPT

## 2017-07-14 PROCEDURE — 94761 N-INVAS EAR/PLS OXIMETRY MLT: CPT

## 2017-07-14 RX ORDER — FUROSEMIDE 40 MG/1
40 TABLET ORAL 2 TIMES DAILY
Qty: 60 TABLET | Refills: 0 | Status: SHIPPED | OUTPATIENT
Start: 2017-07-14 | End: 2018-07-14

## 2017-07-14 RX ORDER — ACETAMINOPHEN 500 MG
1000 TABLET ORAL EVERY 8 HOURS PRN
Refills: 0 | COMMUNITY
Start: 2017-07-14

## 2017-07-14 RX ORDER — IPRATROPIUM BROMIDE AND ALBUTEROL SULFATE 2.5; .5 MG/3ML; MG/3ML
3 SOLUTION RESPIRATORY (INHALATION) EVERY 4 HOURS PRN
Qty: 100 VIAL | Refills: 0 | Status: SHIPPED | OUTPATIENT
Start: 2017-07-14 | End: 2018-07-14

## 2017-07-14 RX ORDER — ENOXAPARIN SODIUM 100 MG/ML
40 INJECTION SUBCUTANEOUS EVERY 12 HOURS
Qty: 0.4 ML | Refills: 0 | Status: SHIPPED | OUTPATIENT
Start: 2017-07-14

## 2017-07-14 RX ORDER — GABAPENTIN 300 MG/1
300 CAPSULE ORAL 3 TIMES DAILY
Qty: 90 CAPSULE | Refills: 0 | Status: SHIPPED | OUTPATIENT
Start: 2017-07-14 | End: 2018-07-14

## 2017-07-14 RX ADMIN — ENOXAPARIN SODIUM 40 MG: 100 INJECTION SUBCUTANEOUS at 05:07

## 2017-07-14 RX ADMIN — GABAPENTIN 300 MG: 300 CAPSULE ORAL at 05:07

## 2017-07-14 RX ADMIN — FUROSEMIDE 40 MG: 40 TABLET ORAL at 08:07

## 2017-07-14 RX ADMIN — Medication 3 ML: at 06:07

## 2017-07-14 RX ADMIN — GABAPENTIN 300 MG: 300 CAPSULE ORAL at 02:07

## 2017-07-14 NOTE — PLAN OF CARE
Alison informed pt can transport to [facility] room 206. Alison arranged [facility] to transport pt. [facility] stated needing to arranged special transport and will call back with time.       RN please call report to [facility] ph: [phone].     Rajiv Khalil, MICKEYW   [id number]

## 2017-07-14 NOTE — PLAN OF CARE
[facility]     Department of Hospital Medicine     [address] [place] [address]     [phone] after hours  [phone] fax                                        FACILITY TRANSFER ORDERS     Patient Name: Naeem Murray  YOB: [birth year]    Admit to:  rehab    Diagnoses:  Active Hospital Problems    Diagnosis  POA    *Acute on chronic respiratory failure with hypercapnia [J96.22]  Yes     Priority: 1 - High    Leg pain [M79.606]  Yes     Priority: 2     Chronic respiratory acidosis [E87.2]  Yes     Priority: 2     Morbid obesity [E66.01]  Yes     Priority: 5     Congestive heart failure [I50.9]  Yes     Priority: 6     Pulmonary hypertension [I27.2]  Yes     Priority: 6     Hypoalbuminemia [E88.09]  Yes     Priority: 7     Discharge planning issues [Z02.9]  Not Applicable      Resolved Hospital Problems    Diagnosis Date Resolved POA   No resolved problems to display.       Vital Signs: Routine.    Allergies:  Review of patient's allergies indicates:   Allergen Reactions    Morphine        Diet: diabetic diet: 1800 calorie     Acitivities: activity as tolerated    Nursing: Decubitus precautions, turn every 2 hours. Out of bed q day.     Labs: none, BMP in 7 days.     CONSULTS:     Physical Therapy to evaluate and treat     Occupational Therapy to evaluate and treat     Routine Skin for Bedridden Patients:  Apply moisture barrier cream to all skin folds and wet areas in perineal area daily and after baths and  all bowel movements.    Medications:      Naeem Murray   Home Medication Instructions JETHRO:[id number]    Printed on:07/14/17 1111   Medication Information                      acetaminophen (TYLENOL) 500 MG tablet  Take 2 tablets (1,000 mg total) by mouth every 8 (eight) hours as needed.             albuterol-ipratropium 2.5mg-0.5mg/3mL (DUO-NEB) 0.5 mg-3 mg(2.5 mg base)/3 mL nebulizer solution  Take 3 mLs by  nebulization every 4 (four) hours as needed for Wheezing or Shortness of Breath. Rescue             enoxaparin (LOVENOX) 40 mg/0.4 mL Syrg  Inject 0.4 mLs (40 mg total) into the skin every 12 (twelve) hours.             furosemide (LASIX) 40 MG tablet  Take 1 tablet (40 mg total) by mouth 2 (two) times daily.             gabapentin (NEURONTIN) 300 MG capsule  Take 1 capsule (300 mg total) by mouth 3 (three) times daily.                       _________________________________  Santa Ragsdale MD  07/14/2017

## 2017-07-14 NOTE — ASSESSMENT & PLAN NOTE
Stable  Dc IV lasix, replace w lasix 40 po bid  Echo ef 60 5 , no DD.  F/u cardiology for pulm HTN.

## 2017-07-14 NOTE — ASSESSMENT & PLAN NOTE
7/14 - pt is considering bariatric surgery, missed apt because he was in the hospital, needs to reschedule  With pulm HTM

## 2017-07-14 NOTE — PLAN OF CARE
Sw received call from Tammi Richardson who stated transportation should arrive to take pt within this hour.       Rajiv Khalil, [facility]   [id number]

## 2017-07-14 NOTE — ASSESSMENT & PLAN NOTE
"7/14 Stable on 2- 4 liters  willl need home bipap 20/10  Hypercapnic - increased risk of death with opioids.  Gave hi " Safe use and SE of Opioids"       "

## 2017-07-14 NOTE — ASSESSMENT & PLAN NOTE
7/14 - denies pain today.   OA right knee and right hip per xray  Tylenol  Opioids not indicated for chronic pain and contra-indcated with chronic resp failure and hypercapnea, unless end of life care desired.  Tylenol 1000 q 8 prn, , neurontin 300 tid

## 2017-07-14 NOTE — PLAN OF CARE
Problem: Cardiac: Heart Failure (Adult)  Goal: Signs and Symptoms of Listed Potential Problems Will be Absent, Minimized or Managed (Cardiac: Heart Failure)  Signs and symptoms of listed potential problems will be absent, minimized or managed by discharge/transition of care (reference Cardiac: Heart Failure (Adult) CPG).   Pt AOx4; VSS; 93% on 2LNC; denies pain; discharge plan reviewed with patient; mother at bedside; no acute distress; will continue to monitor

## 2017-07-14 NOTE — DISCHARGE SUMMARY
"[facility] Hospital Medicine  Discharge Summary      Patient Name: Naeem Murray  MRN: [id number]  Admission Date: 7/9/2017  Hospital Length of Stay: 5 days  Discharge Date and Time: No discharge date for patient encounter.  Attending Physician: Santa Ragsdale MD   Discharging Provider: Santa Ragsdale MD  Primary Care Provider: Provider [facility] Medicine Team: [facility] Santa Ragsdale MD    HPI:   42 year old male who presents as a transfer from [facility] where he had been admitted for hypercapneic respiratory failure on 7/6/16. He has a PMHx of CHF, pulmonary hypertension, cellulitis, hypertension and morbid obesity (now ~650lbs down from 780). On the evening of 7/5/17 Mr. Murray has worsening SOB which led him to be transported to [facility]. Until 3 months ago, Mr. Murray was mobilizing without assistance and able to climb a few stairs into his home but from March 2017 on he has become permanently bed bound due to leg pain. He was taking Percocet for leg pain, ran out and didn't return to the physician for any of his prescriptions to be refilled. He utilizes CPAP during sleep and requires home oxygen at 4L now, which he is complaint with. Per his mother Berenice he has been dealing with these respiratory issues for 11 years. She also endorses that he has ceased his at home medications since becoming bed bound.             * No surgery found *      Indwelling Lines/Drains at time of discharge:   Lines/Drains/Airways          No matching active lines, drains, or airways        Hospital Course:   Hospital/ICU Course:  7/9/2017 - patient on BiPAP of 20/10, satting well, CMP with Bicarb of 43  7/12 - sleeping well and tolerating bipap,  Discussed leg pain, xrays + OA, opiods not indicated, discuuse risk of death with hyppoercapnea and gave hime ' Safe Use and SE of Opioids"    7/13 - pt happy, mother in room, spoke with them and w sister over facetime.  " Discussed his medical problems: pulmonary HTN, CHF,  bariatric surgery, and OA of the hip and knee.  Pt ready for rehab unit.  Pt not in pain.   7/14 - Slept well, comfortable.  Understands there is too much risk of respiratory suppression for opioid therapy.  I reviewed meds, labs and echo results  and discharge plans w patient ans his mother,  Pt need f/u after rehab w bariatric surgery.      Consults:   Consults         Status Ordering Provider     Inpatient consult to Physical Medicine Rehab  Once     Provider:  (Not yet assigned)    Completed EMMA GREGORY     Inpatient consult to PICC team ([facility])  Once     Provider:  (Not yet assigned)    Completed BETHANIE MUNOZ          Significant Diagnostic Studies: Labs:   CMP   Recent Labs  Lab 07/13/17  0405 07/13/17  1616 07/14/17  0407    139 140   K 4.1 4.3 4.3   CL 90* 88* 90*   CO2 46* 43* 43*   * 119* 104   BUN 18 17 16   CREATININE 1.2 1.2 1.1   CALCIUM 9.4 9.5 9.3   ANIONGAP 5* 8 7*   ESTGFRAFRICA >60.0 >60.0 >60.0   EGFRNONAA >60.0 >60.0 >60.0    and CBC No results for input(s): WBC, HGB, HCT, PLT in the last 48 hours.    Pending Diagnostic Studies:     Procedure Component Value Units Date/Time    Basic metabolic panel [[id number]]     Order Status:  Sent Lab Status:  No result     Specimen:  Blood from Blood         Final Active Diagnoses:    Diagnosis Date Noted POA    PRINCIPAL PROBLEM:  Acute on chronic respiratory failure with hypercapnia [J96.22] 07/09/2017 Yes    Leg pain [M79.606] 07/11/2017 Yes    Chronic respiratory acidosis [E87.2] 07/10/2017 Yes    Morbid obesity [E66.01] 07/09/2017 Yes    Congestive heart failure [I50.9] 07/10/2017 Yes    Pulmonary hypertension [I27.2] 07/09/2017 Yes    Hypoalbuminemia [E88.09] 07/10/2017 Yes    Discharge planning issues [Z02.9] 07/12/2017 Not Applicable      Problems Resolved During this Admission:    Diagnosis Date Noted Date Resolved POA      * Acute on chronic respiratory failure with  "hypercapnia    7/14 Stable on 2- 4 liters  willl need home bipap 20/10  Hypercapnic - increased risk of death with opioids.  Gave hi " Safe use and SE of Opioids"             Leg pain    7/14 - denies pain today.   OA right knee and right hip per xray  Tylenol  Opioids not indicated for chronic pain and contra-indcated with chronic resp failure and hypercapnea, unless end of life care desired.  Tylenol 1000 q 8 prn, , neurontin 300 tid        Chronic respiratory acidosis    7/14 - Hypoercapnia with chronic metabolic alkalosis (CO2 40s), stable  Required home oxygen 4 liters and bipap 20/10          Morbid obesity    7/14 - pt is considering bariatric surgery, missed apt because he was in the hospital, needs to reschedule  With pulm HTM          Congestive heart failure    Stable  Dc IV lasix, replace w lasix 40 po bid  Echo nl:  F/u with cardiology for pulm HTN          Pulmonary hypertension    Stable  Echo ef 60%, no DD          Hypoalbuminemia    Alb 3.3          Discharge planning issues    DVT prophylaxis:  Lovenox bid  Consulted rehab                Discharged Condition: good    Disposition: Rehab Facility    Follow Up:  Follow-up Information     Provider [facility].               Patient Instructions:     Diet general       Medications:  Reconciled Home Medications:   Current Discharge Medication List      START taking these medications    Details   acetaminophen (TYLENOL) 500 MG tablet Take 2 tablets (1,000 mg total) by mouth every 8 (eight) hours as needed.  Refills: 0      albuterol-ipratropium 2.5mg-0.5mg/3mL (DUO-NEB) 0.5 mg-3 mg(2.5 mg base)/3 mL nebulizer solution Take 3 mLs by nebulization every 4 (four) hours as needed for Wheezing or Shortness of Breath. Rescue  Qty: 100 vial, Refills: 0      enoxaparin (LOVENOX) 40 mg/0.4 mL Syrg Inject 0.4 mLs (40 mg total) into the skin every 12 (twelve) hours.  Qty: 0.4 mL, Refills: 0      furosemide (LASIX) 40 MG tablet Take 1 tablet (40 mg total) by mouth 2 " (two) times daily.  Qty: 60 tablet, Refills: 0      gabapentin (NEURONTIN) 300 MG capsule Take 1 capsule (300 mg total) by mouth 3 (three) times daily.  Qty: 90 capsule, Refills: 0           Time spent on the discharge of patient: 40  minutes    Santa Ragsdale MD  Department of Hospital Medicine  [facility]

## 2017-07-14 NOTE — ASSESSMENT & PLAN NOTE
7/14 - Hypoercapnia with chronic metabolic alkalosis (CO2 40s), stable  Required home oxygen 4 liters and bipap 20/10

## 2017-07-15 NOTE — NURSING
Pt discharged at 1739; midline removed prior to discharge; pt transported via [place]; mother at bedside; will continue to monitor

## 2017-07-15 NOTE — NURSING
Dr. Ragsdale notified of critical Co2 of 41; also notified of pt not having a bowel movement since admit; pt denies discomfort and reports passing gas; denies suppository; will continue to monitor

## 2017-07-16 NOTE — PT/OT/SLP DISCHARGE
Occupational Therapy Discharge Summary    Naeem Murray  MRN: [id number]   Acute on chronic respiratory failure with hypercapnia   Patient Discharged from acute Occupational Therapy on 07/14/17.  Please refer to prior OT note dated on 07/13/17 for functional status.     Assessment:   Patient appropriate for care in another setting.  GOALS:    Occupational Therapy Goals     Not on file          Multidisciplinary Problems (Resolved)        Problem: Occupational Therapy Goal    Goal Priority Disciplines Outcome Interventions   Occupational Therapy Goal   (Resolved)     OT, PT/OT Outcome(s) achieved    Description:  Goals to be met by: 7-18-17     Patient will increase functional independence with ADLs by performing:    UE Dressing with Stand-by Assistance.  LE Dressing with Maximum Assistance.  Grooming while EOB with Supervision. -GOAL MET 07/13/17  Rolling to Bilateral with Stand-by Assistance.   Supine to sit with Stand-by Assistance.                     Reasons for Discontinuation of Therapy Services  Transfer to alternate level of care.      Plan:  Patient Discharged to: Inpatient Rehab.

## 2017-07-18 NOTE — PT/OT/SLP DISCHARGE
Physical Therapy Discharge Summary    Naeem Murray  MRN: [id number]   Acute on chronic respiratory failure with hypercapnia   Patient Discharged from acute Physical Therapy on 2017.  Please refer to prior PT noted date on 2017 for functional status.     Assessment:   Patient appropriate for care in another setting.  GOALS:    Physical Therapy Goals     Not on file          Multidisciplinary Problems (Resolved)        Problem: Physical Therapy Goal    Goal Priority Disciplines Outcome Goal Variances Interventions   Physical Therapy Goal   (Resolved)     PT/OT, PT Outcome(s) achieved     Description:  Goals to be met by: 2017     Patient will increase functional independence with mobility by performin. Supine to sit with Contact Guard Assistance  2. Sit to supine with MInimal Assistance  3. Rolling to Left and Right with Modified [place].  4. Wheelchair propulsion x50 feet with Modified [place] using bilateral uppper extremities and KATHERINE LE  5. Sitting at edge of bed x20 minutes with Modified [place]  6. Lower extremity exercise program x20 reps per handout, with assistance as needed for increased strength and endurance to increase safety and independence with above mobility goals.  7. Sit to stand transfer with max A with appropriate AD.                     Reasons for Discontinuation of Therapy Services  Transfer to alternate level of care.      Plan:  Patient Discharged to: Inpatient Rehab.    MONISHA CARDENAS, PT   2017

## 2018-09-05 ENCOUNTER — OUTSIDE PLACE OF SERVICE (OUTPATIENT)
Dept: CARDIOLOGY | Facility: CLINIC | Age: 43
End: 2018-09-05
Payer: MEDICARE

## 2018-09-05 PROCEDURE — 93010 ELECTROCARDIOGRAM REPORT: CPT | Mod: S$GLB,,, | Performed by: INTERNAL MEDICINE

## 2019-04-18 ENCOUNTER — LAB VISIT (OUTPATIENT)
Dept: LAB | Facility: HOSPITAL | Age: 44
End: 2019-04-18
Attending: INTERNAL MEDICINE
Payer: MEDICARE

## 2019-04-18 DIAGNOSIS — I50.9 HEART FAILURE, UNSPECIFIED: Primary | ICD-10-CM

## 2019-04-18 DIAGNOSIS — J96.12 CHRONIC RESPIRATORY FAILURE WITH HYPERCAPNIA: ICD-10-CM

## 2019-04-18 LAB
ANION GAP SERPL CALC-SCNC: 4 MMOL/L (ref 8–16)
BUN SERPL-MCNC: 21 MG/DL (ref 2–20)
CALCIUM SERPL-MCNC: 8.7 MG/DL (ref 8.7–10.5)
CHLORIDE SERPL-SCNC: 90 MMOL/L (ref 95–110)
CO2 SERPL-SCNC: 46 MMOL/L (ref 23–29)
CREAT SERPL-MCNC: 1.08 MG/DL (ref 0.5–1.4)
EST. GFR  (AFRICAN AMERICAN): >60 ML/MIN/1.73 M^2
EST. GFR  (NON AFRICAN AMERICAN): >60 ML/MIN/1.73 M^2
GLUCOSE SERPL-MCNC: 183 MG/DL (ref 70–110)
NT-PROBNP: 124 PG/ML (ref 5–450)
POTASSIUM SERPL-SCNC: 4.3 MMOL/L (ref 3.5–5.1)
SODIUM SERPL-SCNC: 140 MMOL/L (ref 136–145)

## 2019-04-18 PROCEDURE — 80048 BASIC METABOLIC PNL TOTAL CA: CPT | Mod: PO

## 2019-04-18 PROCEDURE — 83880 ASSAY OF NATRIURETIC PEPTIDE: CPT | Mod: PO

## 2019-04-18 PROCEDURE — 36415 COLL VENOUS BLD VENIPUNCTURE: CPT | Mod: PO

## 2019-07-26 ENCOUNTER — TELEPHONE (OUTPATIENT)
Dept: BARIATRICS | Facility: CLINIC | Age: 44
End: 2019-07-26

## 2019-07-26 NOTE — TELEPHONE ENCOUNTER
Returning call from Caterina, patient's girlfriend.   Reason for call: caller states pt is bed bound and is on O2 24 hrs/day and has breathing machine. She would like to have the pt seen in bariatric clinic. Caller has questions for nurse.   L/M for pt to call us back at direct call back [phone].

## 2019-09-18 ENCOUNTER — APPOINTMENT (OUTPATIENT)
Dept: LAB | Facility: HOSPITAL | Age: 44
End: 2019-09-18
Attending: INTERNAL MEDICINE
Payer: MEDICARE

## 2019-09-18 DIAGNOSIS — N39.0 URINARY TRACT INFECTION, SITE NOT SPECIFIED: Primary | ICD-10-CM

## 2019-09-18 LAB
BILIRUB UR QL STRIP: NEGATIVE
CLARITY UR REFRACT.AUTO: ABNORMAL
COLOR UR AUTO: YELLOW
GLUCOSE UR QL STRIP: NEGATIVE
HGB UR QL STRIP: ABNORMAL
KETONES UR QL STRIP: NEGATIVE
LEUKOCYTE ESTERASE UR QL STRIP: ABNORMAL
MICROSCOPIC COMMENT: ABNORMAL
NITRITE UR QL STRIP: NEGATIVE
PH UR STRIP: 6 [PH] (ref 5–8)
PROT UR QL STRIP: NEGATIVE
RBC #/AREA URNS AUTO: 1 /HPF (ref 0–4)
SP GR UR STRIP: 1.01 (ref 1–1.03)
URN SPEC COLLECT METH UR: ABNORMAL
UROBILINOGEN UR STRIP-ACNC: NEGATIVE EU/DL
WBC #/AREA URNS AUTO: 8 /HPF (ref 0–5)

## 2019-09-18 PROCEDURE — 81000 URINALYSIS NONAUTO W/SCOPE: CPT | Mod: PO

## 2019-09-18 PROCEDURE — 87086 URINE CULTURE/COLONY COUNT: CPT | Mod: PO

## 2019-09-19 LAB — BACTERIA UR CULT: NORMAL

## 2022-01-25 ENCOUNTER — LAB VISIT (OUTPATIENT)
Dept: LAB | Facility: HOSPITAL | Age: 47
End: 2022-01-25
Attending: SURGERY
Payer: MEDICARE

## 2022-01-25 DIAGNOSIS — I10 ESSENTIAL (PRIMARY) HYPERTENSION: ICD-10-CM

## 2022-01-25 DIAGNOSIS — D52.9 FOLATE DEFICIENCY ANEMIA: ICD-10-CM

## 2022-01-25 DIAGNOSIS — E66.8 CONSTITUTIONAL OBESITY: Primary | ICD-10-CM

## 2022-01-25 DIAGNOSIS — E78.2 MIXED HYPERLIPIDEMIA: ICD-10-CM

## 2022-01-25 DIAGNOSIS — R73.09 ELEVATED HEMOGLOBIN A1C: ICD-10-CM

## 2022-01-25 DIAGNOSIS — R63.1 POLYDIPSIA: ICD-10-CM

## 2022-01-25 LAB
25(OH)D3+25(OH)D2 SERPL-MCNC: 9 NG/ML (ref 30–96)
ALBUMIN SERPL BCP-MCNC: 3.7 G/DL (ref 3.5–5.2)
ALP SERPL-CCNC: 112 U/L (ref 38–126)
ALT SERPL W/O P-5'-P-CCNC: 22 U/L (ref 10–44)
ANION GAP SERPL CALC-SCNC: 10 MMOL/L (ref 8–16)
AST SERPL-CCNC: 22 U/L (ref 15–46)
BASOPHILS # BLD AUTO: 0.02 K/UL (ref 0–0.2)
BASOPHILS NFR BLD: 0.4 % (ref 0–1.9)
BILIRUB SERPL-MCNC: 0.8 MG/DL (ref 0.1–1)
CALCIUM SERPL-MCNC: 9.2 MG/DL (ref 8.7–10.5)
CHLORIDE SERPL-SCNC: 89 MMOL/L (ref 95–110)
CHOLEST SERPL-MCNC: 121 MG/DL (ref 120–199)
CHOLEST/HDLC SERPL: 2.9 {RATIO} (ref 2–5)
CO2 SERPL-SCNC: 36 MMOL/L (ref 23–29)
CREAT SERPL-MCNC: 1.24 MG/DL (ref 0.5–1.4)
DIFFERENTIAL METHOD: ABNORMAL
EOSINOPHIL # BLD AUTO: 0.2 K/UL (ref 0–0.5)
EOSINOPHIL NFR BLD: 3.7 % (ref 0–8)
ERYTHROCYTE [DISTWIDTH] IN BLOOD BY AUTOMATED COUNT: 14.4 % (ref 11.5–14.5)
EST. GFR  (AFRICAN AMERICAN): >60 ML/MIN/1.73 M^2
EST. GFR  (NON AFRICAN AMERICAN): >60 ML/MIN/1.73 M^2
ESTIMATED AVG GLUCOSE: 352 MG/DL (ref 68–131)
FOLATE SERPL-MCNC: 7 NG/ML (ref 4–24)
GLUCOSE SERPL-MCNC: 440 MG/DL (ref 70–110)
HBA1C MFR BLD: 13.9 % (ref 4–5.6)
HCT VFR BLD AUTO: 42.9 % (ref 40–54)
HDLC SERPL-MCNC: 42 MG/DL (ref 40–75)
HDLC SERPL: 34.7 % (ref 20–50)
HGB BLD-MCNC: 12.8 G/DL (ref 14–18)
IMM GRANULOCYTES # BLD AUTO: 0.02 K/UL (ref 0–0.04)
IMM GRANULOCYTES NFR BLD AUTO: 0.4 % (ref 0–0.5)
LDLC SERPL CALC-MCNC: 65.6 MG/DL (ref 63–159)
LYMPHOCYTES # BLD AUTO: 0.9 K/UL (ref 1–4.8)
LYMPHOCYTES NFR BLD: 16.8 % (ref 18–48)
MAGNESIUM SERPL-MCNC: 1.6 MG/DL (ref 1.6–2.6)
MCH RBC QN AUTO: 27.5 PG (ref 27–31)
MCHC RBC AUTO-ENTMCNC: 29.8 G/DL (ref 32–36)
MCV RBC AUTO: 92 FL (ref 82–98)
MONOCYTES # BLD AUTO: 0.4 K/UL (ref 0.3–1)
MONOCYTES NFR BLD: 7.8 % (ref 4–15)
NEUTROPHILS # BLD AUTO: 3.6 K/UL (ref 1.8–7.7)
NEUTROPHILS NFR BLD: 70.9 % (ref 38–73)
NONHDLC SERPL-MCNC: 79 MG/DL
NRBC BLD-RTO: 0 /100 WBC
PLATELET # BLD AUTO: 56 K/UL (ref 150–450)
PMV BLD AUTO: 12.9 FL (ref 9.2–12.9)
POTASSIUM SERPL-SCNC: 4.7 MMOL/L (ref 3.5–5.1)
PREALB SERPL-MCNC: 10 MG/DL (ref 20–43)
PROT SERPL-MCNC: 8.2 G/DL (ref 6–8.4)
RBC # BLD AUTO: 4.65 M/UL (ref 4.6–6.2)
SODIUM SERPL-SCNC: 135 MMOL/L (ref 136–145)
T4 FREE SERPL-MCNC: 0.94 NG/DL (ref 0.71–1.51)
TRIGL SERPL-MCNC: 67 MG/DL (ref 30–150)
TSH SERPL DL<=0.005 MIU/L-ACNC: 6.23 UIU/ML (ref 0.4–4)
UUN UR-MCNC: 18 MG/DL (ref 2–20)
VIT B12 SERPL-MCNC: 832 PG/ML (ref 210–950)
WBC # BLD AUTO: 5.13 K/UL (ref 3.9–12.7)

## 2022-01-25 PROCEDURE — 83735 ASSAY OF MAGNESIUM: CPT | Mod: PO | Performed by: SURGERY

## 2022-01-25 PROCEDURE — 82746 ASSAY OF FOLIC ACID SERUM: CPT | Mod: PO | Performed by: SURGERY

## 2022-01-25 PROCEDURE — 80061 LIPID PANEL: CPT | Performed by: SURGERY

## 2022-01-25 PROCEDURE — 86677 HELICOBACTER PYLORI ANTIBODY: CPT | Mod: PO | Performed by: SURGERY

## 2022-01-25 PROCEDURE — 83036 HEMOGLOBIN GLYCOSYLATED A1C: CPT | Performed by: SURGERY

## 2022-01-25 PROCEDURE — 85025 COMPLETE CBC W/AUTO DIFF WBC: CPT | Mod: PO | Performed by: SURGERY

## 2022-01-25 PROCEDURE — 82306 VITAMIN D 25 HYDROXY: CPT | Mod: PO | Performed by: SURGERY

## 2022-01-25 PROCEDURE — 84134 ASSAY OF PREALBUMIN: CPT | Performed by: SURGERY

## 2022-01-25 PROCEDURE — 36415 COLL VENOUS BLD VENIPUNCTURE: CPT | Mod: PO | Performed by: SURGERY

## 2022-01-25 PROCEDURE — 80053 COMPREHEN METABOLIC PANEL: CPT | Mod: PO | Performed by: SURGERY

## 2022-01-25 PROCEDURE — 82607 VITAMIN B-12: CPT | Mod: PO | Performed by: SURGERY

## 2022-01-25 PROCEDURE — 84439 ASSAY OF FREE THYROXINE: CPT | Performed by: SURGERY

## 2022-01-25 PROCEDURE — 84443 ASSAY THYROID STIM HORMONE: CPT | Mod: PO | Performed by: SURGERY

## 2022-01-28 LAB — H PYLORI IGG SERPL QL IA: POSITIVE

## 2022-02-23 ENCOUNTER — LAB VISIT (OUTPATIENT)
Dept: LAB | Facility: HOSPITAL | Age: 47
End: 2022-02-23
Attending: SURGERY
Payer: MEDICARE

## 2022-02-23 DIAGNOSIS — E21.5 PARATHYROID DISEASE: Primary | ICD-10-CM

## 2022-02-23 LAB — PTH-INTACT SERPL-MCNC: 119.7 PG/ML (ref 9–77)

## 2022-02-23 PROCEDURE — 36415 COLL VENOUS BLD VENIPUNCTURE: CPT | Mod: PO | Performed by: SURGERY

## 2022-02-23 PROCEDURE — 83970 ASSAY OF PARATHORMONE: CPT | Mod: PO | Performed by: SURGERY
